# Patient Record
Sex: MALE | Race: WHITE | NOT HISPANIC OR LATINO | Employment: FULL TIME | URBAN - METROPOLITAN AREA
[De-identification: names, ages, dates, MRNs, and addresses within clinical notes are randomized per-mention and may not be internally consistent; named-entity substitution may affect disease eponyms.]

---

## 2018-03-15 ENCOUNTER — OFFICE VISIT (OUTPATIENT)
Dept: FAMILY MEDICINE | Facility: CLINIC | Age: 58
End: 2018-03-15
Payer: COMMERCIAL

## 2018-03-15 VITALS
TEMPERATURE: 98.5 F | OXYGEN SATURATION: 98 % | WEIGHT: 259.9 LBS | SYSTOLIC BLOOD PRESSURE: 118 MMHG | BODY MASS INDEX: 39.39 KG/M2 | DIASTOLIC BLOOD PRESSURE: 74 MMHG | HEIGHT: 68 IN | HEART RATE: 78 BPM

## 2018-03-15 DIAGNOSIS — E11.9 TYPE 2 DIABETES MELLITUS WITHOUT COMPLICATION, WITHOUT LONG-TERM CURRENT USE OF INSULIN (H): ICD-10-CM

## 2018-03-15 DIAGNOSIS — Z12.11 COLON CANCER SCREENING: ICD-10-CM

## 2018-03-15 DIAGNOSIS — Z01.818 PREOP GENERAL PHYSICAL EXAM: Primary | ICD-10-CM

## 2018-03-15 DIAGNOSIS — E66.01 MORBID OBESITY (H): ICD-10-CM

## 2018-03-15 DIAGNOSIS — M48.061 SPINAL STENOSIS OF LUMBAR REGION, UNSPECIFIED WHETHER NEUROGENIC CLAUDICATION PRESENT: ICD-10-CM

## 2018-03-15 LAB
ALBUMIN SERPL-MCNC: 4 G/DL (ref 3.4–5)
ALP SERPL-CCNC: 74 U/L (ref 40–150)
ALT SERPL W P-5'-P-CCNC: 60 U/L (ref 0–70)
ANION GAP SERPL CALCULATED.3IONS-SCNC: 7 MMOL/L (ref 3–14)
AST SERPL W P-5'-P-CCNC: 49 U/L (ref 0–45)
BILIRUB SERPL-MCNC: 0.7 MG/DL (ref 0.2–1.3)
BUN SERPL-MCNC: 13 MG/DL (ref 7–30)
CALCIUM SERPL-MCNC: 8.8 MG/DL (ref 8.5–10.1)
CHLORIDE SERPL-SCNC: 107 MMOL/L (ref 94–109)
CO2 SERPL-SCNC: 27 MMOL/L (ref 20–32)
CREAT SERPL-MCNC: 0.72 MG/DL (ref 0.66–1.25)
ERYTHROCYTE [DISTWIDTH] IN BLOOD BY AUTOMATED COUNT: 13.8 % (ref 10–15)
GFR SERPL CREATININE-BSD FRML MDRD: >90 ML/MIN/1.7M2
GLUCOSE SERPL-MCNC: 112 MG/DL (ref 70–99)
HBA1C MFR BLD: 6.7 % (ref 4.3–6)
HCT VFR BLD AUTO: 46 % (ref 40–53)
HGB BLD-MCNC: 15.4 G/DL (ref 13.3–17.7)
MCH RBC QN AUTO: 29.8 PG (ref 26.5–33)
MCHC RBC AUTO-ENTMCNC: 33.5 G/DL (ref 31.5–36.5)
MCV RBC AUTO: 89 FL (ref 78–100)
PLATELET # BLD AUTO: 210 10E9/L (ref 150–450)
POTASSIUM SERPL-SCNC: 4 MMOL/L (ref 3.4–5.3)
PROT SERPL-MCNC: 6.6 G/DL (ref 6.8–8.8)
RBC # BLD AUTO: 5.16 10E12/L (ref 4.4–5.9)
SODIUM SERPL-SCNC: 141 MMOL/L (ref 133–144)
WBC # BLD AUTO: 3.8 10E9/L (ref 4–11)

## 2018-03-15 PROCEDURE — 83036 HEMOGLOBIN GLYCOSYLATED A1C: CPT | Performed by: FAMILY MEDICINE

## 2018-03-15 PROCEDURE — 36415 COLL VENOUS BLD VENIPUNCTURE: CPT | Performed by: FAMILY MEDICINE

## 2018-03-15 PROCEDURE — 93000 ELECTROCARDIOGRAM COMPLETE: CPT | Performed by: FAMILY MEDICINE

## 2018-03-15 PROCEDURE — 99204 OFFICE O/P NEW MOD 45 MIN: CPT | Performed by: FAMILY MEDICINE

## 2018-03-15 PROCEDURE — 85027 COMPLETE CBC AUTOMATED: CPT | Performed by: FAMILY MEDICINE

## 2018-03-15 PROCEDURE — 80053 COMPREHEN METABOLIC PANEL: CPT | Performed by: FAMILY MEDICINE

## 2018-03-15 NOTE — PROGRESS NOTES
[facility]  [id number] [place] [id number]  [phone]  Dept: [phone]    PRE-OP EVALUATION:  Today's date: 3/15/2018    Jigar Brooks (: 1960) presents for pre-operative evaluation assessment as requested by Dr. Fernández .  He requires evaluation and anesthesia risk assessment prior to undergoing surgery/procedure for treatment of Decompression- Laminectomy L3-L5 .    Proposed Surgery/ Procedure: Decompression- Laminectomy L3-L5 .  Date of Surgery/ Procedure: 3/27/18  Time of Surgery/ Procedure: [facility]  Hospital/Surgical Facility: [facility]   Fax number for surgical facility: [phone]  Primary Physician: Joselito Lepe  Type of Anesthesia Anticipated: General    Patient has a Health Care Directive or Living Will:  NO    1. NO - Do you have a history of heart attack, stroke, stent, bypass or surgery on an artery in the head, neck, heart or legs?  2. NO - Do you ever have any pain or discomfort in your chest?  3. NO - Do you have a history of  Heart Failure?  4. NO - Are you troubled by shortness of breath when: walking on the level, up a slight hill or at night?  5. NO - Do you currently have a cold, bronchitis or other respiratory infection?  6. NO - Do you have a cough, shortness of breath or wheezing?  7. YES - DO YOU SOMETIMES GET PAINS IN THE CALVES OF YOUR LEGS WHEN YOU WALK?   8. NO - Do you or anyone in your family have previous history of blood clots?  9. NO - Do you or does anyone in your family have a serious bleeding problem such as prolonged bleeding following surgeries or cuts?  10. NO - Have you ever had problems with anemia or been told to take iron pills?  11. NO - Have you had any abnormal blood loss such as black, tarry or bloody stools, or abnormal vaginal bleeding?  12. NO - Have you ever had a blood transfusion?  13. NO - Have you or any of your relatives ever had problems with anesthesia?  14. NO - Do you have sleep apnea, excessive snoring  or daytime drowsiness?  15. NO - Do you have any prosthetic heart valves?  16. YES - DO YOU HAVE PROSTHETIC JOINTS? Knee Replacement   17. NO - Is there any chance that you may be pregnant?    HPI:     HPI related to upcoming procedure: Spinal stenosis of lumbar region     Patient with obesity. Has not been as active as normal secondary to back issue.    Patient with diagnosis of diabetes mellitus, type II based on A1c done at preop today of 6.7.  Patient is okay to continue with surgery as planned as we will have initial diet management for diabetes currently controlled.    MEDICAL HISTORY:     Patient Active Problem List    Diagnosis Date Noted     Type 2 diabetes mellitus without complication (H) 03/16/2018     Priority: Medium     Morbid obesity (H) 03/15/2018     Priority: Medium     Pain in joint, lower leg 03/03/2011     Priority: Medium     Knee joint replacement by other means 03/03/2011     Priority: Medium     Aftercare following joint replacement 03/03/2011     Priority: Medium     Obesity 09/02/2010     Priority: Medium     OA (osteoarthritis) of knee 11/23/2009     Priority: Medium      Past Medical History:   Diagnosis Date     Other and unspecified disc disorder of lumbar region     L4-L5     Past Surgical History:   Procedure Laterality Date     C ANESTH,KNEE JOINT,CLOSED PROCEDURE      right and left     C REPAIR CRUCIATE LIGAMENT,KNEE      right     carpal tunnel surgery right       SURGICAL HISTORY OF -       discectomy cervical     No current outpatient prescriptions on file.     OTC products: None, except as noted above    Allergies   Allergen Reactions     Nkda [No Known Drug Allergies]       Latex Allergy: NO    Social History   Substance Use Topics     Smoking status: Former Smoker     Smokeless tobacco: Never Used     Alcohol use Yes      Comment: 8/week     History   Drug Use No       REVIEW OF SYSTEMS:   CONSTITUTIONAL: NEGATIVE for fever, chills, change in weight  INTEGUMENTARY/SKIN:  "NEGATIVE for worrisome rashes, moles or lesions  EYES: NEGATIVE for vision changes or irritation  ENT/MOUTH: NEGATIVE for ear, mouth and throat problems  RESP: NEGATIVE for significant cough or SOB  CV: NEGATIVE for chest pain, palpitations or peripheral edema  GI: NEGATIVE for nausea, abdominal pain, heartburn, or change in bowel habits  : NEGATIVE for frequency, dysuria, or hematuria  MUSCULOSKELETAL: NEGATIVE for significant arthralgias or myalgia  NEURO: NEGATIVE for weakness, dizziness or paresthesias  PSYCHIATRIC: NEGATIVE for changes in mood or affect    This document serves as a record of the services and decisions personally performed and made by Joselito Lepe MD. It was created on his behalf by Ursula Hawkins, a trained medical scribe. The creation of this document is based on the provider's statements to the medical scribe.  Ursula Hawkins 9:19 AM March 15, 2018    EXAM:   /74 (BP Location: Right arm, Patient Position: Chair, Cuff Size: Adult Regular)  Pulse 78  Temp 98.5  F (36.9  C) (Oral)  Ht 5' 8\" (1.727 m)  Wt 259 lb 14.4 oz (117.9 kg)  SpO2 98%  BMI 39.52 kg/m2    GENERAL APPEARANCE: healthy, alert and no distress     EYES: EOMI,  PERRL     HENT: ear canals and TM's normal and nose and mouth without ulcers or lesions     NECK: no adenopathy, no asymmetry, masses, or scars and thyroid normal to palpation     RESP: lungs clear to auscultation - no rales, rhonchi or wheezes     CV: regular rates and rhythm, normal S1 S2, no S3 or S4 and no murmur, click or rub     ABDOMEN:  soft, nontender, no HSM or masses and bowel sounds normal     MS: extremities normal- no gross deformities noted, no evidence of inflammation in joints, FROM in all extremities.     SKIN: no suspicious lesions or rashes     NEURO: Normal strength and tone, sensory exam grossly normal, mentation intact and speech normal     PSYCH: mentation appears normal. and affect normal/bright    DIAGNOSTICS:   EKG: appears normal, " NSR, normal axis, normal intervals, no acute ST/T changes c/w ischemia, no LVH by voltage criteria, unchanged from previous tracings    Recent Labs   Lab Test  02/14/11   1552  09/22/10   1528   06/17/10   0933   HGB  15.1  14.7   < >  14.8   PLT  224  296   < >  265   NA   --    --    --   142   POTASSIUM   --    --    --   4.3   CR   --    --    --   0.83    < > = values in this interval not displayed.     IMPRESSION:   Reason for surgery/procedure: Spinal stenosis of lumbar region   Diagnosis/reason for consult: preoperative evaluation for assessment of cardiovascular and respiratory disease as well as overall risk assessment and perioperative medical management.      The proposed surgical procedure is considered INTERMEDIATE risk.    REVISED CARDIAC RISK INDEX  The patient has the following serious cardiovascular risks for perioperative complications such as (MI, PE, VFib and 3  AV Block):  No serious cardiac risks  INTERPRETATION: 0 risks: Class I (very low risk - 0.4% complication rate)    The patient has the following additional risks for perioperative complications:  No identified additional risks      ICD-10-CM    1. Preop general physical exam Z01.818 EKG 12-lead complete w/read - Clinics     Hemoglobin A1c     CBC with platelets     Comprehensive metabolic panel   2. Spinal stenosis of lumbar region, unspecified whether neurogenic claudication present M48.061    3. Morbid obesity (H) E66.01    4. Colon cancer screening Z12.11 Fecal colorectal cancer screen FIT   5. Type 2 diabetes mellitus without complication, without long-term current use of insulin (H) E11.9      RECOMMENDATIONS:     --Patient is to take all scheduled medications on the day of surgery EXCEPT for modifications listed below.    Anticoagulant or Antiplatelet Medication Use  ASPIRIN: Discontinue ASA 7-10 days prior to procedure to reduce bleeding risk.  It should be resumed post-operatively.  NSAIDS: Ibuprofen (Motrin):         Stop five  days prior to surgery  Naproxen (Naprosyn):   Stop five days prior to surgery        APPROVAL GIVEN to proceed with proposed procedure, without further diagnostic evaluation     The information in this document, created by the medical scribe for me, accurately reflects the services I personally performed and the decisions made by me. I have reviewed and approved this document for accuracy prior to leaving the patient care area.  March 15, 2018 9:30 AM    Signed Electronically by: Joselito Lepe MD    Copy of this evaluation report is provided to requesting physician.    [facility] Preop Guidelines

## 2018-03-15 NOTE — MR AVS SNAPSHOT
After Visit Summary   3/15/2018    Jigar Brooks    MRN: [id number]           Patient Information     Date Of Birth          1960        Visit Information        Provider Department      3/15/2018 9:00 AM Joselito Lepe MD [facility]        Today's Diagnoses     Preop general physical exam    -  1    Spinal stenosis of lumbar region, unspecified whether neurogenic claudication present        Morbid obesity (H)          Care Instructions      Before Your Surgery      Call your surgeon if there is any change in your health. This includes signs of a cold or flu (such as a sore throat, runny nose, cough, rash or fever).    Do not smoke, drink alcohol or take over the counter medicine (unless your surgeon or primary care doctor tells you to) for the 24 hours before and after surgery.    If you take prescribed drugs: Follow your doctor s orders about which medicines to take and which to stop until after surgery.    Eating and drinking prior to surgery: follow the instructions from your surgeon    Take a shower or bath the night before surgery. Use the soap your surgeon gave you to gently clean your skin. If you do not have soap from your surgeon, use your regular soap. Do not shave or scrub the surgery site.  Wear clean pajamas and have clean sheets on your bed.           Follow-ups after your visit        Who to contact     If you have questions or need follow up information about today's clinic visit or your schedule please contact [facility] directly at [phone].  Normal or non-critical lab and imaging results will be communicated to you by MyChart, letter or phone within 4 business days after the clinic has received the results. If you do not hear from us within 7 days, please contact the clinic through MyChart or phone. If you have a critical or abnormal lab result, we will notify you by phone as soon as possible.  Submit refill requests through [employer] or call  "your pharmacy and they will forward the refill request to us. Please allow 3 business days for your refill to be completed.          Additional Information About Your Visit        MyChart Information     [employer] gives you secure access to your electronic health record. If you see a primary care provider, you can also send messages to your care team and make appointments. If you have questions, please call your primary care clinic.  If you do not have a primary care provider, please call [phone] and they will assist you.        Care EveryWhere ID     This is your Care EveryWhere ID. This could be used by other organizations to access your [facility] records  [id number]        Your Vitals Were     Pulse Temperature Height Pulse Oximetry BMI (Body Mass Index)       78 98.5  F (36.9  C) (Oral) 5' 8\" (1.727 m) 98% 39.52 kg/m2        Blood Pressure from Last 3 Encounters:   03/15/18 118/74   08/15/13 110/66   02/14/11 118/76    Weight from Last 3 Encounters:   03/15/18 259 lb 14.4 oz (117.9 kg)   08/15/13 246 lb (111.6 kg)   02/14/11 269 lb (122 kg)              We Performed the Following     CBC with platelets     Comprehensive metabolic panel     EKG 12-lead complete w/read - Clinics     Hemoglobin A1c          Today's Medication Changes          These changes are accurate as of 3/15/18  9:30 AM.  If you have any questions, ask your nurse or doctor.               Stop taking these medicines if you haven't already. Please contact your care team if you have questions.     amoxicillin 500 MG capsule   Commonly known as:  AMOXIL   Stopped by:  Joselito Lepe MD           NO ACTIVE MEDICATIONS   Stopped by:  Joselito Lepe MD                    Primary Care Provider Office Phone # Fax #    Joselito Lepe [phone] [address]  [facility] [address]        Equal Access to Services     MADDY LOBATO AH: Samson Salinas, katie angulo, qavladislavta lorelei escobedo, " marilin ruiarteirwin orta'aan ah. So [facility] [phone].    ATENCIÓN: Si habla español, tiene a chacon disposición servicios gratuitos de asistencia lingüística. Abi al [phone].    We comply with applicable federal civil rights laws and [place] laws. We do not discriminate on the basis of race, color, national origin, age, disability, sex, sexual orientation, or gender identity.            Thank you!     Thank you for choosing [facility]  for your care. Our goal is always to provide you with excellent care. Hearing back from our patients is one way we can continue to improve our services. Please take a few minutes to complete the written survey that you may receive in the mail after your visit with us. Thank you!             Your Updated Medication List - Protect others around you: Learn how to safely use, store and throw away your medicines at www.disposemymeds.org.      Notice  As of 3/15/2018  9:30 AM    You have not been prescribed any medications.

## 2018-03-16 PROBLEM — E11.9 TYPE 2 DIABETES MELLITUS WITHOUT COMPLICATION (H): Status: ACTIVE | Noted: 2018-03-16

## 2018-03-20 ENCOUNTER — TELEPHONE (OUTPATIENT)
Dept: FAMILY MEDICINE | Facility: CLINIC | Age: 58
End: 2018-03-20

## 2018-03-20 NOTE — TELEPHONE ENCOUNTER
Preop faxed to [phone], please call patient and inform him of Dr. Lepe message below.  Tori Chow

## 2018-03-20 NOTE — TELEPHONE ENCOUNTER
Patient with elevated A1c consistent with diabetes type 2.  Recommend follow-up in clinic for further discussion of diagnosis and ongoing management.  Ok to go ahead with surgery as planned as this would be considered controlled DM and no further blood sugar management would be needed perioperatively.

## 2018-05-10 ENCOUNTER — TRANSFERRED RECORDS (OUTPATIENT)
Dept: HEALTH INFORMATION MANAGEMENT | Facility: CLINIC | Age: 58
End: 2018-05-10

## 2018-05-22 ENCOUNTER — TRANSFERRED RECORDS (OUTPATIENT)
Dept: HEALTH INFORMATION MANAGEMENT | Facility: CLINIC | Age: 58
End: 2018-05-22

## 2018-10-10 ENCOUNTER — TELEPHONE (OUTPATIENT)
Dept: FAMILY MEDICINE | Facility: CLINIC | Age: 58
End: 2018-10-10

## 2018-10-10 NOTE — TELEPHONE ENCOUNTER
Panel Management Review      Patient has the following on his problem list:     Diabetes    ASA: Failed    Last A1C  Lab Results   Component Value Date    A1C 6.7 03/15/2018     A1C tested: Passed    Last LDL:    Lab Results   Component Value Date    CHOL 214 11/23/2009     Lab Results   Component Value Date    HDL 44 11/23/2009     Lab Results   Component Value Date     11/23/2009     Lab Results   Component Value Date    TRIG 114 11/23/2009     Lab Results   Component Value Date    CHOLHDLRATIO 4.9 11/23/2009     No results found for: NHDL    Is the patient on a Statin? NO             Is the patient on Aspirin? NO        Last three blood pressure readings:  BP Readings from Last 3 Encounters:   03/15/18 118/74   08/15/13 110/66   02/14/11 118/76       Date of last diabetes office visit: 3/15/18     Tobacco History:     History   Smoking Status     Former Smoker   Smokeless Tobacco     Never Used           Composite cancer screening  Chart review shows that this patient is due/due soon for the following 3 month diabetes check, colonoscopy   Summary:    Patient is due/failing the following:   A1C- 3 month diabetes check , colonoscopy     Action needed:   Patient needs office visit for diabetes check .    Type of outreach:    Phone, left message for patient to call back.     Questions for provider review:    None                                                                                                                                    Ila Del Real CMA       Chart routed to none .

## 2019-02-08 ENCOUNTER — TELEPHONE (OUTPATIENT)
Dept: ORTHOPEDICS | Facility: CLINIC | Age: 59
End: 2019-02-08

## 2019-02-08 ENCOUNTER — ANCILLARY PROCEDURE (OUTPATIENT)
Dept: GENERAL RADIOLOGY | Facility: CLINIC | Age: 59
End: 2019-02-08
Attending: PHYSICIAN ASSISTANT
Payer: COMMERCIAL

## 2019-02-08 ENCOUNTER — PRE VISIT (OUTPATIENT)
Dept: ORTHOPEDICS | Facility: CLINIC | Age: 59
End: 2019-02-08

## 2019-02-08 ENCOUNTER — OFFICE VISIT (OUTPATIENT)
Dept: ORTHOPEDICS | Facility: CLINIC | Age: 59
End: 2019-02-08
Payer: COMMERCIAL

## 2019-02-08 ENCOUNTER — ANCILLARY PROCEDURE (OUTPATIENT)
Dept: CT IMAGING | Facility: CLINIC | Age: 59
End: 2019-02-08
Attending: ORTHOPAEDIC SURGERY
Payer: COMMERCIAL

## 2019-02-08 ENCOUNTER — DOCUMENTATION ONLY (OUTPATIENT)
Dept: CARE COORDINATION | Facility: CLINIC | Age: 59
End: 2019-02-08

## 2019-02-08 VITALS — BODY MASS INDEX: 39.19 KG/M2 | HEIGHT: 69 IN | WEIGHT: 264.6 LBS

## 2019-02-08 DIAGNOSIS — M89.9 BONE LESION: ICD-10-CM

## 2019-02-08 DIAGNOSIS — M79.671 RIGHT FOOT PAIN: ICD-10-CM

## 2019-02-08 DIAGNOSIS — M89.9 BONE LESION: Primary | ICD-10-CM

## 2019-02-08 RX ORDER — METHYLPREDNISOLONE 4 MG
TABLET, DOSE PACK ORAL
Qty: 21 TABLET | Refills: 0 | Status: SHIPPED | OUTPATIENT
Start: 2019-02-08 | End: 2022-02-11

## 2019-02-08 ASSESSMENT — MIFFLIN-ST. JEOR: SCORE: 1996.63

## 2019-02-08 NOTE — PROGRESS NOTES
Dear Dr. eLpe,    Thank you for asking me to see  for evaluation of what is likely an incidental finding in the right iliac wing.  As you know he has had significant difficulty with low back problems both in the past and currently.  His evaluation identified proximal at 1.5 cm abnormality seen on his MRI disc lateral to the sacroiliac joint.    We try to confirm the presence of a lesion on x-ray but were not able to provide convincing evidence.  I recommended a CT to be sure the lesion is osteolytic.  If it is not osteolytic then it may be something that could simply be followed with MRI scan.  Also recommended a screening studies for myeloma.    We will keep you and the patient informed on her findings and she will forward.  We did prescribe a Medrol Dosepak today she is having significant buttock and radicular pain.    Thank you for involving his care.    Sincerely

## 2019-02-08 NOTE — NURSING NOTE
"Chief Complaint   Patient presents with     Consult     Pt. states that he is here today for a Lesion of his Pelvis, Right Side. He states that a year ago he had a Laminectomy 1yr ago along with a Disectomy that he had a Bone graft done in of the Iliac Bone, around 8yrs ago.  He states that a couple weeks ago his lower back became bothersome after he slipped a week prior onto his back. He states that he went in for the pain and an MRI was ordered detecting the Lesion. Referring: Dr. Coleman       59 year old  1960    Ht 1.746 m (5' 8.75\")   Wt 120 kg (264 lb 9.6 oz)   BMI 39.36 kg/m           [employer] [facility] - [place], [place] - [address] [facility] AT [address]      Allergies   Allergen Reactions     Nkda [No Known Drug Allergies]        No current outpatient medications on file.     No current facility-administered medications for this visit.                    "

## 2019-02-08 NOTE — LETTER
RE: Jigar Brooks  [address] [id number]     Dear Colleague,    Thank you for referring your patient, Jigar Brooks, to the [facility]. Please see a copy of my visit note below.    Chief Complaint: R iliac bone lesion with right-sided low back pain; also c/o right lateral foot pain after fall 3 wks ago    History: Jigar is a 59-year-old male who is here with his wife today for further evaluation and treatment of a right posterior pelvis bone lesion noted on MRI.  Patient reports that he fell 3 weeks ago.  Approximately a week later, he started having pain in the right posterior pelvis and low back area.  He did not have any radiating leg pain.  He was concerned, however, because he does have a history of a lumbar laminectomy and discectomy.  He also has a history of a anterior cervical spine fusion with grafting from the right side of the pelvis.  He denies any bruising or swelling.  He denies any mass.  Prior to a few weeks ago, he was not having any pain in this area.  He has taken Vicodin sporadically with little relief.  He tried ibuprofen with no relief.  Heat does seem to help somewhat.  He is not using anything for gait assistance.  He had an MRI to see what was the issue, and they noted a bone lesion in the right posterior iliac bone near the SI joint, which was new from the previous MRI 2 years ago.  He had an MRI with and without contrast.  He also had a bone scan.  He denies any other areas of pain, except right foot pain laterally, which he injured in the fall as well.  He thinks he inverted his foot and ankle.  No other concerns.  He is semiretired, but used to work as a  and did prolonged standing.  Now he does mostly desk work.  He used to smoke many decades ago for a few years, but nothing recently.  He is otherwise fairly healthy.  I reviewed his medical intake in the electronic medical record.    Past  "Medical History:   Diagnosis Date     Other and unspecified disc disorder of lumbar region     L4-L5       Past Surgical History:   Procedure Laterality Date     C ANESTH,KNEE JOINT,CLOSED PROCEDURE      right and left     C REPAIR CRUCIATE LIGAMENT,KNEE      right     carpal tunnel surgery right       SURGICAL HISTORY OF -       discectomy cervical       Family History   Problem Relation Age of Onset     Diabetes Mother      Diabetes Father      Heart Disease Father         afib at age 81     C.A.D. Paternal Grandfather          age 58     Breast Cancer No family hx of      Cancer - colorectal No family hx of      Prostate Cancer No family hx of        Social History     Tobacco Use     Smoking status: Former Smoker     Smokeless tobacco: Never Used   Substance Use Topics     Alcohol use: Yes     Comment: 8/week       Meds:   No current outpatient medications on file.     No current facility-administered medications for this visit.        Allergies:    Allergies   Allergen Reactions     Nkda [No Known Drug Allergies]      Physical Exam: Ht 1.746 m (5' 8.75\")   Wt 120 kg (264 lb 9.6 oz)   BMI 39.36 kg/m     Jigar is a pleasant 59-year-old male who is alert and oriented and in no distress.  He is pacing about the room, as it is difficult for him to sit.  He complains of pain with palpation around the right SI joint area.  He does have a midline incision in the lumbar spine consistent with his previous back surgery.  There is no obvious swelling, erythema or ecchymosis.  He is previous iliac crest scar is in the anterior inguinal fold.  He has pain with forward flexion and is quite limited due to this pain.  He is able to hyperextend the lumbar spine somewhat, but is again limited by pain posteriorly.  He has approximately 75% of normal rotation and side bending, again with pain.  No radiating leg pain.  He has full strength and range of motion of the hips and knees bilaterally.  He does have some pain and " tenderness along the lateral border of the right foot with no obvious crepitus.  He has some pain along the peroneals of the right ankle.  He is otherwise neurovascularly intact.    Imaging: MRI with and without contrast of the pelvis done at outside facility shows a approximately 2 cm enhancing mass in the right posterior ilium, with no associated bone marrow edema or fracture.  This could represent a benign lesion or perhaps metastatic lesion or multiple myeloma.  Whole body bone scan does not show any specific uptake in the area of this lesion.  AP pelvis and inlet and outlet views of the pelvis x-rays were ordered today show a subtle lucency in the area of the right ilium corresponding to the MRI.  No associated fracture.    Impression: 59-year-old male with right sided low back/SI joint pain with incidental finding of right ilium bone lesion    Plan: We think that this lesion is an incidental finding, and not necessarily the source of the pain.  However, we do need to further work this up.  Would like to get lab work to rule out multiple myeloma, including Kappa/lamda light chain, SPEP, urine protein electrophoresis.  Because this lesion is visible on x-ray, we would like to get a CT of the pelvis to further evaluate the lesion and look for bony destruction.  Based on the results of the labs and CT, we will call him and discuss further intervention, which may include biopsy by interventional radiology or repeat imaging in a few months.  In the meantime, we did prescribe him a Medrol Dosepak to help with his pain, as this has been helpful for him in the past.  He can continue with ice and heat as tolerated.  He can try a lumbar support to see if that is helpful.  He should follow-up with his spine surgeon to further explore this pain source and treatment of his pain.  We will continue with treatment of this bone lesion.  He understands and agrees with the plan of care and all questions have been  answered.  Patient was also examined by Dr. Fields, and he agrees with the plan of care.        Dear Dr. Lepe,    Thank you for asking me to see  for evaluation of what is likely an incidental finding in the right iliac wing.  As you know he has had significant difficulty with low back problems both in the past and currently.  His evaluation identified proximal at 1.5 cm abnormality seen on his MRI disc lateral to the sacroiliac joint.    We try to confirm the presence of a lesion on x-ray but were not able to provide convincing evidence.  I recommended a CT to be sure the lesion is osteolytic.  If it is not osteolytic then it may be something that could simply be followed with MRI scan.  Also recommended a screening studies for myeloma.    We will keep you and the patient informed on her findings and she will forward.  We did prescribe a Medrol Dosepak today she is having significant buttock and radicular pain.    Thank you for involving his care.    Sincerely    Again, thank you for allowing me to participate in the care of your patient.      Sincerely,    Yong Fields MD

## 2019-02-08 NOTE — TELEPHONE ENCOUNTER
RECORDS RECEIVED FROM: pt referred by [facility] for Lesion/Tumor in pelvis, bone marrow 2.2 cm Dr Beach- MRI done at [facility] body scan done at the [facility] (pt has imaging discs with him that he will hand carry to the appt)   DATE RECEIVED: 02/08/19    NOTES STATUS DETAILS   OFFICE NOTE from referring provider Care Everywhere 2/4/19   OFFICE NOTE from other specialist N/A    DISCHARGE SUMMARY from hospital N/A    DISCHARGE REPORT from the ER N/A    OPERATIVE REPORT N/A    MEDICATION LIST Internal    IMPLANT RECORD/STICKER N/A    LABS     CBC/DIFF N/A    CULTURES N/A    INJECTIONS DONE IN RADIOLOGY N/A    MRI Received 2/5/19   CT SCAN N/A    XRAYS (IMAGES & REPORTS) N/A    TUMOR     PATHOLOGY  Slides & report N/A      02/08/19  10:31 AM left vm at [facility] requesting MRI  11:02 AM spoke with [facility] imaging, they will push

## 2019-02-08 NOTE — PROGRESS NOTES
Chief Complaint: R iliac bone lesion with right-sided low back pain; also c/o right lateral foot pain after fall 3 wks ago    History: Jigar is a 59-year-old male who is here with his wife today for further evaluation and treatment of a right posterior pelvis bone lesion noted on MRI.  Patient reports that he fell 3 weeks ago.  Approximately a week later, he started having pain in the right posterior pelvis and low back area.  He did not have any radiating leg pain.  He was concerned, however, because he does have a history of a lumbar laminectomy and discectomy.  He also has a history of a anterior cervical spine fusion with grafting from the right side of the pelvis.  He denies any bruising or swelling.  He denies any mass.  Prior to a few weeks ago, he was not having any pain in this area.  He has taken Vicodin sporadically with little relief.  He tried ibuprofen with no relief.  Heat does seem to help somewhat.  He is not using anything for gait assistance.  He had an MRI to see what was the issue, and they noted a bone lesion in the right posterior iliac bone near the SI joint, which was new from the previous MRI 2 years ago.  He had an MRI with and without contrast.  He also had a bone scan.  He denies any other areas of pain, except right foot pain laterally, which he injured in the fall as well.  He thinks he inverted his foot and ankle.  No other concerns.  He is semiretired, but used to work as a  and did prolonged standing.  Now he does mostly desk work.  He used to smoke many decades ago for a few years, but nothing recently.  He is otherwise fairly healthy.  I reviewed his medical intake in the electronic medical record.    Past Medical History:   Diagnosis Date     Other and unspecified disc disorder of lumbar region     L4-L5       Past Surgical History:   Procedure Laterality Date     C ANESTH,KNEE JOINT,CLOSED PROCEDURE      right and left     C REPAIR CRUCIATE LIGAMENT,KNEE      right      "carpal tunnel surgery right       SURGICAL HISTORY OF -       discectomy cervical       Family History   Problem Relation Age of Onset     Diabetes Mother      Diabetes Father      Heart Disease Father         afib at age 81     C.A.D. Paternal Grandfather          age 58     Breast Cancer No family hx of      Cancer - colorectal No family hx of      Prostate Cancer No family hx of        Social History     Tobacco Use     Smoking status: Former Smoker     Smokeless tobacco: Never Used   Substance Use Topics     Alcohol use: Yes     Comment: 8/week       Meds:   No current outpatient medications on file.     No current facility-administered medications for this visit.        Allergies:    Allergies   Allergen Reactions     Nkda [No Known Drug Allergies]        Review of Systems:  10 pt ROS was negative, except as listed in the HPI.    Physical Exam: Ht 1.746 m (5' 8.75\")   Wt 120 kg (264 lb 9.6 oz)   BMI 39.36 kg/m    Jigar is a pleasant 59-year-old male who is alert and oriented and in no distress.  He is pacing about the room, as it is difficult for him to sit.  He complains of pain with palpation around the right SI joint area.  He does have a midline incision in the lumbar spine consistent with his previous back surgery.  There is no obvious swelling, erythema or ecchymosis.  He is previous iliac crest scar is in the anterior inguinal fold.  He has pain with forward flexion and is quite limited due to this pain.  He is able to hyperextend the lumbar spine somewhat, but is again limited by pain posteriorly.  He has approximately 75% of normal rotation and side bending, again with pain.  No radiating leg pain.  He has full strength and range of motion of the hips and knees bilaterally.  He does have some pain and tenderness along the lateral border of the right foot with no obvious crepitus.  He has some pain along the peroneals of the right ankle.  He is otherwise neurovascularly intact.    Imaging: MRI " with and without contrast of the pelvis done at outside facility shows a approximately 2 cm enhancing mass in the right posterior ilium, with no associated bone marrow edema or fracture.  This could represent a benign lesion or perhaps metastatic lesion or multiple myeloma.  Whole body bone scan does not show any specific uptake in the area of this lesion.  AP pelvis and inlet and outlet views of the pelvis x-rays were ordered today show a subtle lucency in the area of the right ilium corresponding to the MRI.  No associated fracture.    Impression: 59-year-old male with right sided low back/SI joint pain with incidental finding of right ilium bone lesion    Plan: We think that this lesion is an incidental finding, and not necessarily the source of the pain.  However, we do need to further work this up.  Would like to get lab work to rule out multiple myeloma, including Kappa/lamda light chain, SPEP, urine protein electrophoresis.  Because this lesion is visible on x-ray, we would like to get a CT of the pelvis to further evaluate the lesion and look for bony destruction.  Based on the results of the labs and CT, we will call him and discuss further intervention, which may include biopsy by interventional radiology or repeat imaging in a few months.  In the meantime, we did prescribe him a Medrol Dosepak to help with his pain, as this has been helpful for him in the past.  He can continue with ice and heat as tolerated.  He can try a lumbar support to see if that is helpful.  He should follow-up with his spine surgeon to further explore this pain source and treatment of his pain.  We will continue with treatment of this bone lesion.  He understands and agrees with the plan of care and all questions have been answered.  Patient was also examined by Dr. Fields, and he agrees with the plan of care.

## 2019-02-11 LAB
ALBUMIN SERPL ELPH-MCNC: 4.4 G/DL (ref 3.7–5.1)
ALPHA1 GLOB SERPL ELPH-MCNC: 0.4 G/DL (ref 0.2–0.4)
ALPHA2 GLOB SERPL ELPH-MCNC: 0.6 G/DL (ref 0.5–0.9)
B-GLOBULIN SERPL ELPH-MCNC: 0.7 G/DL (ref 0.6–1)
GAMMA GLOB SERPL ELPH-MCNC: 0.5 G/DL (ref 0.7–1.6)
KAPPA LC UR-MCNC: 0.86 MG/DL (ref 0.33–1.94)
KAPPA LC/LAMBDA SER: 0.69 {RATIO} (ref 0.26–1.65)
LAMBDA LC SERPL-MCNC: 1.24 MG/DL (ref 0.57–2.63)
M PROTEIN SERPL ELPH-MCNC: 0 G/DL
PROT PATTERN SERPL ELPH-IMP: ABNORMAL
PROT PATTERN UR ELPH-IMP: NORMAL

## 2019-02-14 ENCOUNTER — TELEPHONE (OUTPATIENT)
Dept: ORTHOPEDICS | Facility: CLINIC | Age: 59
End: 2019-02-14

## 2019-02-14 NOTE — TELEPHONE ENCOUNTER
[facility] Call Center    Phone Message    May a detailed message be left on voicemail: yes    Reason for Call: Requesting Results   Name/type of test: Imaging and labs  Date of test: 02/08/2019  Was test done at a location other than [facility] (Please fill in the location if not [facility])?: No      Action Taken: Message routed to:  [facility] ([facility] Ortho

## 2019-02-15 NOTE — TELEPHONE ENCOUNTER
Spoke with Jigar re: his recent test results.  Labs were negative for any metastatic disease.  The CT of his pelvis showed no bone destruction.  He had been prescribed a medrol dosepak by .  Jigar reports that his back pain has resolved.  Dr. Fields is recommending a repeat MRI of the pelvis in 3 to 4 months to evaluate for any change in the right pelvic lesion.  Jigar would like to have the MRI done by Dr. Beach at [facility] and then sent to Dr. Fields for review.  We erick send all of Jigar's notes and test results to Dr. Beach.

## 2019-09-30 ENCOUNTER — HEALTH MAINTENANCE LETTER (OUTPATIENT)
Age: 59
End: 2019-09-30

## 2021-01-15 ENCOUNTER — HEALTH MAINTENANCE LETTER (OUTPATIENT)
Age: 61
End: 2021-01-15

## 2021-08-29 ENCOUNTER — HEALTH MAINTENANCE LETTER (OUTPATIENT)
Age: 61
End: 2021-08-29

## 2021-10-04 ENCOUNTER — OFFICE VISIT (OUTPATIENT)
Dept: URGENT CARE | Facility: URGENT CARE | Age: 61
End: 2021-10-04
Payer: COMMERCIAL

## 2021-10-04 VITALS
HEART RATE: 81 BPM | BODY MASS INDEX: 29.8 KG/M2 | SYSTOLIC BLOOD PRESSURE: 120 MMHG | DIASTOLIC BLOOD PRESSURE: 60 MMHG | TEMPERATURE: 98.5 F | OXYGEN SATURATION: 97 % | WEIGHT: 200.31 LBS

## 2021-10-04 DIAGNOSIS — S61.216A LACERATION OF RIGHT LITTLE FINGER WITHOUT FOREIGN BODY WITHOUT DAMAGE TO NAIL, INITIAL ENCOUNTER: Primary | ICD-10-CM

## 2021-10-04 PROCEDURE — 90471 IMMUNIZATION ADMIN: CPT | Performed by: PHYSICIAN ASSISTANT

## 2021-10-04 PROCEDURE — 12001 RPR S/N/AX/GEN/TRNK 2.5CM/<: CPT | Performed by: PHYSICIAN ASSISTANT

## 2021-10-04 PROCEDURE — 90715 TDAP VACCINE 7 YRS/> IM: CPT | Performed by: PHYSICIAN ASSISTANT

## 2021-10-04 NOTE — PATIENT INSTRUCTIONS
Patient was educated on the natural course of injury.  Keep wound dry and clean. Wash area with soap and water. Watch for signs of infection. Conservative measures discussed including over-the-counter Tylenol as needed for pain. See your primary care provider in 10 days for suture removal or sooner as needed. Seek emergency care if you develop fever, streaking, severe pain or redness.

## 2021-10-04 NOTE — PROGRESS NOTES
URGENT CARE VISIT:    SUBJECTIVE:   Jigar Brooks is a 61 year old male who presents to the clinic with a laceration on the right pinky fingersustained 1 hour(s) ago.  This is a non-work related injury.  Mechanism of injury: knife.    Associated symptoms: Denies numbness, weakness, or loss of function  Last tetanus booster within 10 years: no    OBJECTIVE:  VITALS: /60 (BP Location: Right arm)   Pulse 81   Temp 98.5  F (36.9  C) (Tympanic)   Wt 90.9 kg (200 lb 5 oz)   SpO2 97%   BMI 29.80 kg/m    General: WDWN in NAD  Size of laceration: 1.5 centimeters  Location of laceration: right 5th pip joint  Characteristics of the laceration: bleeding- mild and straight  Tendon function intact: yes  Sensation to light touch intact: yes  Pulses intact: yes    ASSESSMENT:    ICD-10-CM    1. Laceration of right little finger without foreign body without damage to nail, initial encounter  S61.216A REPAIR SUPERFICIAL, WOUND BODY < =2.5CM       PLAN:  PROCEDURE NOTE:  Prepped and draped in the usual sterile fashion  Wound cleaned with HIBICLENS  Wound was locally injected with 3 cc's of Lidocaine 1% plain  Laceration was closed using 3 4-0 nylon interrupted sutures  Wound was dressed with gauze.     Patient Instructions   Patient was educated on the natural course of injury.  Keep wound dry and clean. Wash area with soap and water. Watch for signs of infection. Conservative measures discussed including over-the-counter Tylenol as needed for pain. See your primary care provider in 10 days for suture removal or sooner as needed. Seek emergency care if you develop fever, streaking, severe pain or redness.       Patient verbalized understanding and is agreeable to plan. The patient was discharged ambulatory and in stable condition.    Tish Burgos PA-C ....................  10/4/2021   5:59 PM

## 2021-10-24 ENCOUNTER — HEALTH MAINTENANCE LETTER (OUTPATIENT)
Age: 61
End: 2021-10-24

## 2022-01-17 ENCOUNTER — TELEPHONE (OUTPATIENT)
Dept: ORTHOPEDICS | Facility: CLINIC | Age: 62
End: 2022-01-17
Payer: COMMERCIAL

## 2022-01-17 NOTE — TELEPHONE ENCOUNTER
ATC called pt and informed him that we can see notes from the visits that he had with [facility]. We don't see the actual imaging yet. ATC informed pt that we have a record team and they will work on this. Pt verified understanding.     Pt also stated that he's supposed to see Dr. Fields on 1/27 but due to being out of town, he rescheduled the visit to 2/10. Pt will give us a call with questions or concerns.           -Jonel, ATC- Orthopedics

## 2022-01-17 NOTE — TELEPHONE ENCOUNTER
[facility] Call Center    Phone Message    May a detailed message be left on voicemail: yes     Reason for Call: Other: Patient wanted to know if records from [facility] have arrived. Please contact patient      Action Taken: Message routed to:  [facility] ortho    Travel Screening: Not Applicable

## 2022-01-18 NOTE — TELEPHONE ENCOUNTER
RECORDS RECEIVED FROM: FU  Lesion/Tumor in pelvis, bone marrow 2.2 cm Dr Beach- MRI done at [facility]  (pt has imaging discs with him that he will hand carry to the appt)   DATE RECEIVED: Feb 10, 2022     NOTES STATUS DETAILS   OFFICE NOTE from referring provider Care Everywhere Cuca Toribio, APRN, CNP     MEDICATION LIST Internal    INJECTIONS DONE IN RADIOLOGY In process-R 1/21/22  MORE IN PACS   MRI In process-R 1/7/22  MORE IN PACS   CT SCAN Internal 2/8/19   XRAYS (IMAGES & REPORTS) Internal 2/8/19 01/28/22   7:52 AM   IMAGES RESOLVED IN PACS  Salome Han CMA      01/27/22   8:43 AM   FAXED REQUEST TO  FOR IMAGES  Salome Han CMA

## 2022-02-10 ENCOUNTER — PRE VISIT (OUTPATIENT)
Dept: ORTHOPEDICS | Facility: CLINIC | Age: 62
End: 2022-02-10

## 2022-02-10 ENCOUNTER — OFFICE VISIT (OUTPATIENT)
Dept: ORTHOPEDICS | Facility: CLINIC | Age: 62
End: 2022-02-10
Payer: COMMERCIAL

## 2022-02-10 DIAGNOSIS — M89.9 BONE LESION: Primary | ICD-10-CM

## 2022-02-10 PROCEDURE — 99213 OFFICE O/P EST LOW 20 MIN: CPT | Performed by: ORTHOPAEDIC SURGERY

## 2022-02-10 NOTE — NURSING NOTE
Chief Complaint   Patient presents with     Consult     right iliac lesion last seen 2019 // referred back by Dr. Beach at [facility] // review recent MRI // been having lower back pain and had bilateral injections about 2 wks ago        62 year old  1960          Pain Assessment  Patient Currently in Pain: Yes  0-10 Pain Scale: 9  Primary Pain Location: Back (lower back shooting to right foot)  Pain Descriptors: Radiating  Alleviating Factors: Rest  Aggravating Factors: Stairs             [id number] - [place], [place] - [address] AT [facility] [address]  [id number] - [place], [place] - [address] AT [facility] [address]        Allergies   Allergen Reactions     Nkda [No Known Drug Allergies]            Current Outpatient Medications   Medication     methylPREDNISolone (MEDROL DOSEPAK) 4 MG tablet therapy pack     No current facility-administered medications for this visit.

## 2022-02-10 NOTE — LETTER
2/10/2022         RE: Jigar Brooks  [address] [facility] [address]        Dear Colleague,    Thank you for referring your patient, Jigar Brooks, to the [facility]. Please see a copy of my visit note below.    Chief Complaint: follow-up right iliac wing bone lesion    HPI: Jigar is a 62 year old male who is here for follow-up of a bone lesion incidentally found on MRI in 2019 when he was having some back pain.  Because of the pandemic, he did not see any providers for quite a while.  He started experiencing recurrent back pain a few months ago.  Since his primary care physician retired, he needed to reestablish care for his back.  A repeat MRI was ordered.  They also wanted to follow-up on the right iliac wing bone lesion and have asked to reassess that.  He recently had bilateral S1 neural foramen injections a couple weeks ago with little relief for his back pain and radiating leg pain.  He has known lumbar arthritis and has a history of previous L3-L4 complete laminectomy and L2 partial laminectomy.  He reports he is otherwise doing well.  He has no pain with sitting, just pain with prolonged walking and going up and down stairs.  No other concerns    Physical Exam: Jigar is a pleasant 62-year-old male who is alert and oriented no apparent distress.  He has a nonantalgic gait without gait assistance today.    Imaging: On 1/7/2022, he had an MRI of the pelvis with and without contrast which showed:  IMPRESSION:   1. Resolution of the previously seen lesion in the right iliac bone since 02/05/2019.   2. Moderate degenerative changes in the sacroiliac joints.   3. Mild degenerative changes in the bilateral hips with no synovitis.   4. Mild strain in the paraspinal muscles on left.   5. Mild gluteus minimus tendinosis bilaterally.    On 1/5/2022, he had a lumbar spine MRI without contrast which showed:  1. Multilevel degenerative changes throughout the lumbar  spine    Impression: 62-year-old male with previous right iliac bone lesion, now completely resolved    Plan: We explained to Jigar that the lesion is no longer visible on MRI.  We reviewed the scans very thoroughly.  We cannot explain what the lesion was or why it has resolved, but we have no concerns about this lesion any longer.  He does not need any further follow-up for the lesion.  He should continue to see his providers in regards to treatment for his low back pain and spinal stenosis.  He understands and agrees with the plan.  All questions have been answered.  Patient was also examined by Dr. Fields, and he agrees with the plan of care.        Attestation signed by Yong Fields MD at 2/11/2022  4:05 PM:  I saw the patient with Linda GARCIA.  I agree with her assessment and plan.

## 2022-02-10 NOTE — PROGRESS NOTES
Chief Complaint: follow-up right iliac wing bone lesion    HPI: Jigar is a 62 year old male who is here for follow-up of a bone lesion incidentally found on MRI in 2019 when he was having some back pain.  Because of the pandemic, he did not see any providers for quite a while.  He started experiencing recurrent back pain a few months ago.  Since his primary care physician retired, he needed to reestablish care for his back.  A repeat MRI was ordered.  They also wanted to follow-up on the right iliac wing bone lesion and have asked to reassess that.  He recently had bilateral S1 neural foramen injections a couple weeks ago with little relief for his back pain and radiating leg pain.  He has known lumbar arthritis and has a history of previous L3-L4 complete laminectomy and L2 partial laminectomy.  He reports he is otherwise doing well.  He has no pain with sitting, just pain with prolonged walking and going up and down stairs.  No other concerns    Physical Exam: Jigar is a pleasant 62-year-old male who is alert and oriented no apparent distress.  He has a nonantalgic gait without gait assistance today.    Imaging: On 1/7/2022, he had an MRI of the pelvis with and without contrast which showed:  IMPRESSION:   1. Resolution of the previously seen lesion in the right iliac bone since 02/05/2019.   2. Moderate degenerative changes in the sacroiliac joints.   3. Mild degenerative changes in the bilateral hips with no synovitis.   4. Mild strain in the paraspinal muscles on left.   5. Mild gluteus minimus tendinosis bilaterally.    On 1/5/2022, he had a lumbar spine MRI without contrast which showed:  1. Multilevel degenerative changes throughout the lumbar spine    Impression: 62-year-old male with previous right iliac bone lesion, now completely resolved    Plan: We explained to Jigar that the lesion is no longer visible on MRI.  We reviewed the scans very thoroughly.  We cannot explain what the lesion was or why it has  resolved, but we have no concerns about this lesion any longer.  He does not need any further follow-up for the lesion.  He should continue to see his providers in regards to treatment for his low back pain and spinal stenosis.  He understands and agrees with the plan.  All questions have been answered.  Patient was also examined by Dr. Fields, and he agrees with the plan of care.

## 2022-02-13 ENCOUNTER — HEALTH MAINTENANCE LETTER (OUTPATIENT)
Age: 62
End: 2022-02-13

## 2022-04-04 SDOH — ECONOMIC STABILITY: INCOME INSECURITY: HOW HARD IS IT FOR YOU TO PAY FOR THE VERY BASICS LIKE FOOD, HOUSING, MEDICAL CARE, AND HEATING?: PATIENT DECLINED

## 2022-04-04 SDOH — ECONOMIC STABILITY: FOOD INSECURITY: WITHIN THE PAST 12 MONTHS, THE FOOD YOU BOUGHT JUST DIDN'T LAST AND YOU DIDN'T HAVE MONEY TO GET MORE.: PATIENT DECLINED

## 2022-04-04 SDOH — ECONOMIC STABILITY: FOOD INSECURITY: WITHIN THE PAST 12 MONTHS, YOU WORRIED THAT YOUR FOOD WOULD RUN OUT BEFORE YOU GOT MONEY TO BUY MORE.: PATIENT DECLINED

## 2022-04-04 SDOH — ECONOMIC STABILITY: TRANSPORTATION INSECURITY
IN THE PAST 12 MONTHS, HAS LACK OF TRANSPORTATION KEPT YOU FROM MEETINGS, WORK, OR FROM GETTING THINGS NEEDED FOR DAILY LIVING?: PATIENT DECLINED

## 2022-04-04 SDOH — HEALTH STABILITY: PHYSICAL HEALTH: ON AVERAGE, HOW MANY MINUTES DO YOU ENGAGE IN EXERCISE AT THIS LEVEL?: 150+ MIN

## 2022-04-04 SDOH — HEALTH STABILITY: PHYSICAL HEALTH: ON AVERAGE, HOW MANY DAYS PER WEEK DO YOU ENGAGE IN MODERATE TO STRENUOUS EXERCISE (LIKE A BRISK WALK)?: 2 DAYS

## 2022-04-04 SDOH — ECONOMIC STABILITY: TRANSPORTATION INSECURITY
IN THE PAST 12 MONTHS, HAS THE LACK OF TRANSPORTATION KEPT YOU FROM MEDICAL APPOINTMENTS OR FROM GETTING MEDICATIONS?: PATIENT DECLINED

## 2022-04-04 SDOH — ECONOMIC STABILITY: INCOME INSECURITY: IN THE LAST 12 MONTHS, WAS THERE A TIME WHEN YOU WERE NOT ABLE TO PAY THE MORTGAGE OR RENT ON TIME?: PATIENT REFUSED

## 2022-04-04 ASSESSMENT — SOCIAL DETERMINANTS OF HEALTH (SDOH)
HOW OFTEN DO YOU GET TOGETHER WITH FRIENDS OR RELATIVES?: ONCE A WEEK
DO YOU BELONG TO ANY CLUBS OR ORGANIZATIONS SUCH AS CHURCH GROUPS UNIONS, FRATERNAL OR ATHLETIC GROUPS, OR SCHOOL GROUPS?: PATIENT DECLINED
IN A TYPICAL WEEK, HOW MANY TIMES DO YOU TALK ON THE PHONE WITH FAMILY, FRIENDS, OR NEIGHBORS?: MORE THAN THREE TIMES A WEEK
HOW OFTEN DO YOU ATTEND CHURCH OR RELIGIOUS SERVICES?: PATIENT DECLINED

## 2022-04-04 ASSESSMENT — LIFESTYLE VARIABLES
HOW OFTEN DO YOU HAVE SIX OR MORE DRINKS ON ONE OCCASION: LESS THAN MONTHLY
HOW MANY STANDARD DRINKS CONTAINING ALCOHOL DO YOU HAVE ON A TYPICAL DAY: 1 OR 2
HOW OFTEN DO YOU HAVE A DRINK CONTAINING ALCOHOL: 2-3 TIMES A WEEK

## 2022-04-06 ENCOUNTER — OFFICE VISIT (OUTPATIENT)
Dept: FAMILY MEDICINE | Facility: CLINIC | Age: 62
End: 2022-04-06
Payer: COMMERCIAL

## 2022-04-06 VITALS
SYSTOLIC BLOOD PRESSURE: 117 MMHG | WEIGHT: 225 LBS | RESPIRATION RATE: 14 BRPM | TEMPERATURE: 98 F | HEART RATE: 58 BPM | HEIGHT: 69 IN | BODY MASS INDEX: 33.33 KG/M2 | DIASTOLIC BLOOD PRESSURE: 74 MMHG

## 2022-04-06 DIAGNOSIS — Z01.818 PREOP GENERAL PHYSICAL EXAM: Primary | ICD-10-CM

## 2022-04-06 DIAGNOSIS — Z71.89 ADVANCED CARE PLANNING/COUNSELING DISCUSSION: ICD-10-CM

## 2022-04-06 DIAGNOSIS — M54.50 LUMBAR BACK PAIN: ICD-10-CM

## 2022-04-06 PROBLEM — E11.9 TYPE 2 DIABETES MELLITUS WITHOUT COMPLICATION (H): Status: RESOLVED | Noted: 2018-03-16 | Resolved: 2022-04-06

## 2022-04-06 PROBLEM — E66.01 MORBID OBESITY (H): Status: RESOLVED | Noted: 2018-03-15 | Resolved: 2022-04-06

## 2022-04-06 LAB
ANION GAP SERPL CALCULATED.3IONS-SCNC: 4 MMOL/L (ref 3–14)
BUN SERPL-MCNC: 18 MG/DL (ref 7–30)
CALCIUM SERPL-MCNC: 8.9 MG/DL (ref 8.5–10.1)
CHLORIDE BLD-SCNC: 107 MMOL/L (ref 94–109)
CO2 SERPL-SCNC: 28 MMOL/L (ref 20–32)
CREAT SERPL-MCNC: 0.7 MG/DL (ref 0.66–1.25)
ERYTHROCYTE [DISTWIDTH] IN BLOOD BY AUTOMATED COUNT: 13.6 % (ref 10–15)
GFR SERPL CREATININE-BSD FRML MDRD: >90 ML/MIN/1.73M2
GLUCOSE BLD-MCNC: 101 MG/DL (ref 70–99)
HBA1C MFR BLD: 5.5 % (ref 0–5.6)
HCT VFR BLD AUTO: 42.9 % (ref 40–53)
HGB BLD-MCNC: 14.2 G/DL (ref 13.3–17.7)
MCH RBC QN AUTO: 30.3 PG (ref 26.5–33)
MCHC RBC AUTO-ENTMCNC: 33.1 G/DL (ref 31.5–36.5)
MCV RBC AUTO: 92 FL (ref 78–100)
PLATELET # BLD AUTO: 197 10E3/UL (ref 150–450)
POTASSIUM BLD-SCNC: 4.2 MMOL/L (ref 3.4–5.3)
RBC # BLD AUTO: 4.68 10E6/UL (ref 4.4–5.9)
SODIUM SERPL-SCNC: 139 MMOL/L (ref 133–144)
WBC # BLD AUTO: 3.8 10E3/UL (ref 4–11)

## 2022-04-06 PROCEDURE — 80048 BASIC METABOLIC PNL TOTAL CA: CPT | Performed by: PHYSICIAN ASSISTANT

## 2022-04-06 PROCEDURE — 99214 OFFICE O/P EST MOD 30 MIN: CPT | Performed by: PHYSICIAN ASSISTANT

## 2022-04-06 PROCEDURE — 36415 COLL VENOUS BLD VENIPUNCTURE: CPT | Performed by: PHYSICIAN ASSISTANT

## 2022-04-06 PROCEDURE — 85027 COMPLETE CBC AUTOMATED: CPT | Performed by: PHYSICIAN ASSISTANT

## 2022-04-06 PROCEDURE — 83036 HEMOGLOBIN GLYCOSYLATED A1C: CPT | Performed by: PHYSICIAN ASSISTANT

## 2022-04-06 PROCEDURE — 93000 ELECTROCARDIOGRAM COMPLETE: CPT | Performed by: PHYSICIAN ASSISTANT

## 2022-04-06 RX ORDER — GABAPENTIN 300 MG/1
CAPSULE ORAL
COMMUNITY
Start: 2022-03-08 | End: 2022-04-06

## 2022-04-06 NOTE — PROGRESS NOTES
[facility]  [address] [address]  Phone: [phone]  Primary Provider: Joselito Lepe  Pre-op Performing Provider: CLIFTON CHILEL      PREOPERATIVE EVALUATION:  Today's date: 4/6/2022    Jigar Brooks is a 62 year old male who presents for a preoperative evaluation.    Surgical Information:  Surgery/Procedure: Decompression revision L3 to L5  Surgery Location: [facility], [facility]  Surgeon: Erasmo Fernández  Surgery Date: 4/18/22  Time of Surgery: 11:00am  Where patient plans to recover: At home with family  Fax number for surgical facility: [phone]    Type of Anesthesia Anticipated: General    Assessment & Plan     The proposed surgical procedure is considered INTERMEDIATE risk.    Preop general physical exam  - Basic metabolic panel  - CBC with platelets  - EKG 12-lead complete w/read - Clinics  - Hemoglobin A1c    Lumbar back pain      Advanced care planning/counseling discussion             Risks and Recommendations:  The patient has the following additional risks and recommendations for perioperative complications:   - No identified additional risk factors other than previously addressed    Medication Instructions:  Patient is to take all scheduled medications on the day of surgery EXCEPT for modifications listed below:   - ibuprofen (Advil, Motrin): HOLD 1 day before surgery.     RECOMMENDATION:  APPROVAL GIVEN to proceed with proposed procedure, without further diagnostic evaluation.                      Subjective     HPI related to upcoming procedure: Back pain    Preop Questions 4/4/2022   1. Have you ever had a heart attack or stroke? No   2. Have you ever had surgery on your heart or blood vessels, such as a stent placement, a coronary artery bypass, or surgery on an artery in your head, neck, heart, or legs? No   3. Do you have chest pain with activity? No   4. Do you have a history of  heart  failure? No   5. Do you currently have a cold, bronchitis or symptoms of other infection? No   6. Do you have a cough, shortness of breath, or wheezing? No   7. Do you or anyone in your family have previous history of blood clots? No   8. Do you or does anyone in your family have a serious bleeding problem such as prolonged bleeding following surgeries or cuts? No   9. Have you ever had problems with anemia or been told to take iron pills? No   10. Have you had any abnormal blood loss such as black, tarry or bloody stools? No   11. Have you ever had a blood transfusion? No   12. Are you willing to have a blood transfusion if it is medically needed before, during, or after your surgery? Yes   13. Have you or any of your relatives ever had problems with anesthesia? UNKNOWN -    14. Do you have sleep apnea, excessive snoring or daytime drowsiness? No   15. Do you have any artifical heart valves or other implanted medical devices like a pacemaker, defibrillator, or continuous glucose monitor? No   16. Do you have artificial joints? YES - both knees   17. Are you allergic to latex? No       Health Care Directive:  Patient does not have a Health Care Directive or Living Will: Discussed advance care planning with patient; however, patient declined at this time.    Preoperative Review of :   reviewed - no record of controlled substances prescribed.      Status of Chronic Conditions:  See problem list for active medical problems.  Problems all longstanding and stable, except as noted/documented.  See ROS for pertinent symptoms related to these conditions.      Review of Systems  Constitutional, neuro, ENT, endocrine, pulmonary, cardiac, gastrointestinal, genitourinary, musculoskeletal, integument and psychiatric systems are negative, except as otherwise noted.    Patient Active Problem List    Diagnosis Date Noted     Pain in joint, lower leg 03/03/2011     Priority: Medium     Knee joint replacement by other means  "03/03/2011     Priority: Medium     Obesity 09/02/2010     Priority: Medium     OA (osteoarthritis) of knee 11/23/2009     Priority: Medium      Past Medical History:   Diagnosis Date     Arthritis     Knees, Spine     Other and unspecified disc disorder of lumbar region     L4-L5     Past Surgical History:   Procedure Laterality Date     BACK SURGERY      Laminectomy, Decompression L3-L5     carpal tunnel surgery right       HEAD & NECK SURGERY      Discectomy, Graphing     SOFT TISSUE SURGERY      Carpal Tunnel Right Hand     SURGICAL HISTORY OF -       discectomy cervical     ZZC ANESTH,KNEE JOINT,CLOSED PROCEDURE      right and left     ZZC REPAIR CRUCIATE LIGAMENT,KNEE      right     No current outpatient medications on file.       No Known Allergies     Social History     Tobacco Use     Smoking status: Former Smoker     Smokeless tobacco: Never Used     Tobacco comment: Occ cigar   Substance Use Topics     Alcohol use: Yes     Comment: A few drinks per week       History   Drug Use No         Objective     /74 (BP Location: Right arm, Patient Position: Chair, Cuff Size: Adult Large)   Pulse 58   Temp 98  F (36.7  C) (Oral)   Resp 14   Ht 1.74 m (5' 8.5\")   Wt 102.1 kg (225 lb)   BMI 33.71 kg/m      Physical Exam    GENERAL APPEARANCE: healthy, alert and no distress     EYES: EOMI,  PERRL     HENT: ear canals and TM's normal and nose and mouth without ulcers or lesions     NECK: no adenopathy, no asymmetry, masses, or scars and thyroid normal to palpation     RESP: lungs clear to auscultation - no rales, rhonchi or wheezes     CV: regular rates and rhythm, normal S1 S2, no S3 or S4 and no murmur, click or rub     ABDOMEN:  soft, nontender, no HSM or masses and bowel sounds normal     MS: extremities normal- no gross deformities noted, no evidence of inflammation in joints, FROM in all extremities.     SKIN: no suspicious lesions or rashes     NEURO: Normal strength and tone, sensory exam grossly " normal, mentation intact and speech normal     PSYCH: mentation appears normal. and affect normal/bright     LYMPHATICS: No cervical adenopathy    No results for input(s): HGB, PLT, INR, NA, POTASSIUM, CR, A1C in the last 49022 hours.     Diagnostics:  Recent Results (from the past 168 hour(s))   CBC with platelets    Collection Time: 04/06/22 10:28 AM   Result Value Ref Range    WBC Count 3.8 (L) 4.0 - 11.0 10e3/uL    RBC Count 4.68 4.40 - 5.90 10e6/uL    Hemoglobin 14.2 13.3 - 17.7 g/dL    Hematocrit 42.9 40.0 - 53.0 %    MCV 92 78 - 100 fL    MCH 30.3 26.5 - 33.0 pg    MCHC 33.1 31.5 - 36.5 g/dL    RDW 13.6 10.0 - 15.0 %    Platelet Count 197 150 - 450 10e3/uL   Hemoglobin A1c    Collection Time: 04/06/22 10:28 AM   Result Value Ref Range    Hemoglobin A1C 5.5 0.0 - 5.6 %      EKG: sinus bradycardia, normal axis, normal intervals, no acute ST/T changes c/w ischemia, no LVH by voltage criteria, unchanged from previous tracings    Revised Cardiac Risk Index (RCRI):  The patient has the following serious cardiovascular risks for perioperative complications:   - No serious cardiac risks = 0 points     RCRI Interpretation: 0 points: Class I (very low risk - 0.4% complication rate)           Signed Electronically by: Tamica Méndez PA-C  Copy of this evaluation report is provided to requesting physician.

## 2022-09-08 ENCOUNTER — OFFICE VISIT (OUTPATIENT)
Dept: URGENT CARE | Facility: URGENT CARE | Age: 62
End: 2022-09-08

## 2022-09-08 ENCOUNTER — ANCILLARY PROCEDURE (OUTPATIENT)
Dept: GENERAL RADIOLOGY | Facility: CLINIC | Age: 62
End: 2022-09-08
Attending: PHYSICIAN ASSISTANT
Payer: COMMERCIAL

## 2022-09-08 VITALS
BODY MASS INDEX: 33.11 KG/M2 | HEART RATE: 66 BPM | SYSTOLIC BLOOD PRESSURE: 118 MMHG | DIASTOLIC BLOOD PRESSURE: 66 MMHG | TEMPERATURE: 98.5 F | OXYGEN SATURATION: 95 % | RESPIRATION RATE: 14 BRPM | WEIGHT: 221 LBS

## 2022-09-08 DIAGNOSIS — J18.9 PNEUMONIA OF RIGHT UPPER LOBE DUE TO INFECTIOUS ORGANISM: Primary | ICD-10-CM

## 2022-09-08 DIAGNOSIS — R50.9 FEVER, UNSPECIFIED FEVER CAUSE: ICD-10-CM

## 2022-09-08 DIAGNOSIS — R05.9 COUGH: ICD-10-CM

## 2022-09-08 LAB
BASOPHILS # BLD AUTO: 0 10E3/UL (ref 0–0.2)
BASOPHILS NFR BLD AUTO: 0 %
EOSINOPHIL # BLD AUTO: 0.3 10E3/UL (ref 0–0.7)
EOSINOPHIL NFR BLD AUTO: 3 %
ERYTHROCYTE [DISTWIDTH] IN BLOOD BY AUTOMATED COUNT: 14.1 % (ref 10–15)
ERYTHROCYTE [SEDIMENTATION RATE] IN BLOOD BY WESTERGREN METHOD: 54 MM/HR (ref 0–20)
HCT VFR BLD AUTO: 41.3 % (ref 40–53)
HGB BLD-MCNC: 13.2 G/DL (ref 13.3–17.7)
LYMPHOCYTES # BLD AUTO: 1 10E3/UL (ref 0.8–5.3)
LYMPHOCYTES NFR BLD AUTO: 12 %
MCH RBC QN AUTO: 28.8 PG (ref 26.5–33)
MCHC RBC AUTO-ENTMCNC: 32 G/DL (ref 31.5–36.5)
MCV RBC AUTO: 90 FL (ref 78–100)
MONOCYTES # BLD AUTO: 0.7 10E3/UL (ref 0–1.3)
MONOCYTES NFR BLD AUTO: 8 %
NEUTROPHILS # BLD AUTO: 6.8 10E3/UL (ref 1.6–8.3)
NEUTROPHILS NFR BLD AUTO: 77 %
PLATELET # BLD AUTO: 260 10E3/UL (ref 150–450)
RBC # BLD AUTO: 4.59 10E6/UL (ref 4.4–5.9)
WBC # BLD AUTO: 8.8 10E3/UL (ref 4–11)

## 2022-09-08 PROCEDURE — 85025 COMPLETE CBC W/AUTO DIFF WBC: CPT | Performed by: PHYSICIAN ASSISTANT

## 2022-09-08 PROCEDURE — 85652 RBC SED RATE AUTOMATED: CPT | Performed by: PHYSICIAN ASSISTANT

## 2022-09-08 PROCEDURE — 99214 OFFICE O/P EST MOD 30 MIN: CPT | Performed by: PHYSICIAN ASSISTANT

## 2022-09-08 PROCEDURE — 71046 X-RAY EXAM CHEST 2 VIEWS: CPT | Mod: TC | Performed by: RADIOLOGY

## 2022-09-08 PROCEDURE — 36415 COLL VENOUS BLD VENIPUNCTURE: CPT | Performed by: PHYSICIAN ASSISTANT

## 2022-09-08 RX ORDER — BENZONATATE 200 MG/1
200 CAPSULE ORAL 3 TIMES DAILY PRN
Qty: 30 CAPSULE | Refills: 0 | Status: SHIPPED | OUTPATIENT
Start: 2022-09-08

## 2022-09-08 RX ORDER — AZITHROMYCIN 250 MG/1
TABLET, FILM COATED ORAL
Qty: 6 TABLET | Refills: 0 | Status: SHIPPED | OUTPATIENT
Start: 2022-09-08 | End: 2022-09-13

## 2022-09-08 NOTE — PROGRESS NOTES
Assessment & Plan     Pneumonia of right upper lobe due to infectious organism  Findings on chest radiograph and elevated ESR consistent with possible early bacterial pneumonia.  As such we will treat with antibiotics at this time.  Also provided antitussive for symptomatic management.  Advised follow-up with primary care provider if no improvement in symptoms over the course of the next week.  Discussed reasons for which to return more urgently should symptoms be worsening.  - CBC with platelets and differential  - ESR: Erythrocyte sedimentation rate  - XR Chest 2 Views  - CBC with platelets and differential  - ESR: Erythrocyte sedimentation rate  - amoxicillin-clavulanate (AUGMENTIN) 875-125 MG tablet  Dispense: 14 tablet; Refill: 0  - azithromycin (ZITHROMAX) 250 MG tablet  Dispense: 6 tablet; Refill: 0  - benzonatate (TESSALON) 200 MG capsule  Dispense: 30 capsule; Refill: 0     I spent a total of 20 minutes on the day of the visit.   Time spent doing chart review, history and exam, documentation and further activities per the note    Return in about 1 week (around 9/15/2022) for reevaluation with PCP if symptoms not improving, return earlier if symptoms are worsening.    Giuliano Shin PA-C  [facility]       Shaka Kimball is a 62 year old male who presents to clinic today for the following health issues:  Chief Complaint   Patient presents with     Sick     ------POSITIVE covid 19 on 9/4  home test ------Situation/Background (brief explanation of current symptoms/situation): Patient calling, states he tested positive for Covid on 9/4, initial symptoms started 10 days ago. Currently experiencing sinus congestion, fatigue, cough, intermittent nose bleeding, and a fever of 101.8 with sweats. Cough is dry at this point, was previously productive, patient is coughing more when laying down. No shortness of breath or chest pain/pressure noted  -- took sudafed     HPI  Patient is a  62-year-old male without significant history presenting to urgent care for evaluation of fever and cough.  Patient developed upper respiratory infectious symptoms approximately 10 days ago and subsequently had a positive home COVID test 4 days ago.  Patient notes that initially had more severe symptoms, had significant amount of sinus congestion and pressure.  Majority of the symptoms have seemed to resolve.  Did have some bilateral low back pain as well that has since resolved.  Now only notes a persistent cough that seems to be worse when laying down.  Congestion and rhinorrhea nearly resolved.  Notes having fever daily that is intermittent.  Has been treating at home with over-the-counter antipyretics.  States that he took 1000 mg of ibuprofen around 11 AM today, on arrival in urgent care no fever.  Notes highest temperature at home has been 102.8  F.  Denies any headache, neck pain, sore throat, ear pain, chest pain, dyspnea, abdominal pain, urinary symptoms, GI symptoms, skin changes or rash, lightheadedness, or other complaints.    Review of Systems  Focused ROS obtained, pertinent positives/negatives reviewed in the HPI.       Objective    /66 (BP Location: Right arm, Patient Position: Chair, Cuff Size: Adult Regular)   Pulse 66   Temp 98.5  F (36.9  C) (Oral)   Resp 14   Wt 100.2 kg (221 lb)   SpO2 95%   BMI 33.11 kg/m    Physical Exam   GENERAL: healthy, alert and no distress  HENT: normal cephalic/atraumatic, ear canals and TM's normal, nose and mouth without ulcers or lesions, oropharynx clear and oral mucous membranes moist  NECK: no adenopathy  RESP: lungs clear to auscultation - no rales, rhonchi or wheezes  CV: regular rates and rhythm, peripheral pulses strong and no peripheral edema  SKIN: no suspicious lesions or rashes      Narrative & Impression   EXAM: XR CHEST 2 VIEWS  LOCATION: [facility]  DATE/TIME: 9/8/2022 7:09 PM     INDICATION: Persistent cough and  fever. Positive COVID test at home.  COMPARISON: None.                                                                      IMPRESSION: Small streaky airspace in the peripheral right upper lobe could represent a small focus of infection. Faint nodular opacities overlying the lung bases have no correlates on the lateral view and are favored to relate to nipple shadows and/or   other overlapping structures. No pleural effusions or pneumothorax. Nonenlarged cardiac silhouette.         Results for orders placed or performed in visit on 09/08/22 (from the past 24 hour(s))   CBC with platelets and differential    Narrative    The following orders were created for panel order CBC with platelets and differential.  Procedure                               Abnormality         Status                     ---------                               -----------         ------                     CBC with platelets and d...[[id number]]  Abnormal            Final result                 Please view results for these tests on the individual orders.   ESR: Erythrocyte sedimentation rate   Result Value Ref Range    Erythrocyte Sedimentation Rate 54 (H) 0 - 20 mm/hr   CBC with platelets and differential   Result Value Ref Range    WBC Count 8.8 4.0 - 11.0 10e3/uL    RBC Count 4.59 4.40 - 5.90 10e6/uL    Hemoglobin 13.2 (L) 13.3 - 17.7 g/dL    Hematocrit 41.3 40.0 - 53.0 %    MCV 90 78 - 100 fL    MCH 28.8 26.5 - 33.0 pg    MCHC 32.0 31.5 - 36.5 g/dL    RDW 14.1 10.0 - 15.0 %    Platelet Count 260 150 - 450 10e3/uL    % Neutrophils 77 %    % Lymphocytes 12 %    % Monocytes 8 %    % Eosinophils 3 %    % Basophils 0 %    Absolute Neutrophils 6.8 1.6 - 8.3 10e3/uL    Absolute Lymphocytes 1.0 0.8 - 5.3 10e3/uL    Absolute Monocytes 0.7 0.0 - 1.3 10e3/uL    Absolute Eosinophils 0.3 0.0 - 0.7 10e3/uL    Absolute Basophils 0.0 0.0 - 0.2 10e3/uL

## 2022-09-09 NOTE — PATIENT INSTRUCTIONS
Use medication as directed.  Follow-up with primary care provider if no improvement in symptoms over the course of the next several days.  Return more urgently for any significant worsening of symptoms.

## 2022-10-15 ENCOUNTER — HEALTH MAINTENANCE LETTER (OUTPATIENT)
Age: 62
End: 2022-10-15

## 2023-03-26 ENCOUNTER — HEALTH MAINTENANCE LETTER (OUTPATIENT)
Age: 63
End: 2023-03-26

## 2023-05-11 ENCOUNTER — OFFICE VISIT (OUTPATIENT)
Dept: URGENT CARE | Facility: URGENT CARE | Age: 63
End: 2023-05-11
Payer: COMMERCIAL

## 2023-05-11 VITALS
DIASTOLIC BLOOD PRESSURE: 73 MMHG | SYSTOLIC BLOOD PRESSURE: 118 MMHG | RESPIRATION RATE: 20 BRPM | TEMPERATURE: 101.2 F | OXYGEN SATURATION: 97 % | HEART RATE: 88 BPM

## 2023-05-11 DIAGNOSIS — B02.9 HERPES ZOSTER WITHOUT COMPLICATION: Primary | ICD-10-CM

## 2023-05-11 PROCEDURE — 99213 OFFICE O/P EST LOW 20 MIN: CPT | Performed by: FAMILY MEDICINE

## 2023-05-11 PROCEDURE — 87798 DETECT AGENT NOS DNA AMP: CPT | Performed by: FAMILY MEDICINE

## 2023-05-11 RX ORDER — VALACYCLOVIR HYDROCHLORIDE 1 G/1
1000 TABLET, FILM COATED ORAL 3 TIMES DAILY
Qty: 30 TABLET | Refills: 0 | Status: SHIPPED | OUTPATIENT
Start: 2023-05-11 | End: 2023-05-21

## 2023-05-11 NOTE — PROGRESS NOTES
ASSESSMENT/  PLAN:  Herpes zoster without complication     - valACYclovir (VALTREX) 1000 mg tablet; Take 1 tablet (1,000 mg) by mouth 3 times daily for 10 days  - Varicella Zoster DNA PCR CSF or Skin Swab; Future  - Varicella Zoster DNA PCR CSF or Skin Swab       We discussed that the antiviral medications can decrease the severity of the zoster attack and reduce the risk of post -herpetic neuralgia when started promptly at the start of zoster symptoms, and that a delay in starting treatment produces less favorable results.  .  I advised the patient that zoster is chicken pox virus, and that  unvaccinated small children, pregnant women and adults who have not had chicken pox should be avoided until all skin lesions have dried and scabbed over  to prevent transmission of the disease.      Follow-up with primary clinic if not improving     May take acetaminophen / ibuprofen as an alternative for pain    ---------------------------------------------------------------------------------------------------------------------------------------------------------------------    SUBJECTIVE:  Chief Complaint   Patient presents with     Urgent Care     Derm Problem     Blisters on right side from buttocks area down to foot-Also some Chills and feels feverish-Patient noted he was fly fishing-Possibly happened 4-5 days ago-OTC Cortisone 10     Jigar Brooks is a 63 year old male  who presents to the clinic today for a  painful area of skin with  rash.  Onset   was 5 day(s) ago and  is gradual onset, still present, worsening and constant.    Location  : right buttocks, foot, lower leg and thigh.  Quality/symptoms : painful, red and blistering   Symptoms are moderate and the affected skin seems to be worsening.  Previous history of a similar symptoms? No  Recent exposure history: happened on trip fly fishing-  He suspected exposure to a poisonous plant     Associated symptoms include: fever.    Past Medical History:   Diagnosis  Date     Arthritis     Knees, Spine     Other and unspecified disc disorder of lumbar region     L4-L5     Type 2 diabetes mellitus without complication (H)      Patient Active Problem List   Diagnosis     OA (osteoarthritis) of knee     Obesity     Pain in joint, lower leg     Knee joint replacement by other means       ALLERGIES:  Patient has no known allergies.    benzonatate (TESSALON) 200 MG capsule, Take 1 capsule (200 mg) by mouth 3 times daily as needed for cough    No current facility-administered medications on file prior to visit.      Social History     Tobacco Use     Smoking status: Former     Smokeless tobacco: Never     Tobacco comments:     Occ cigar   Vaping Use     Vaping status: Never Used   Substance Use Topics     Alcohol use: Yes     Comment: A few drinks per week       Family History   Problem Relation Age of Onset     Cerebrovascular Disease Mother      Breast Cancer Mother      Obesity Mother      Diabetes Father      Heart Disease Father         afib at age 81     Obesity Father      Obesity Sister      Obesity Sister      Obesity Brother      C.A.D. Paternal Grandfather          age 58     Cancer - colorectal No family hx of      Prostate Cancer No family hx of          ROS:  CONSTITUTIONAL:  fever, chills,    EYES: NEGATIVE for vision changes or irritation  ENT/MOUTH: NEGATIVE for ear, mouth and throat problems  RESP:NEGATIVE for significant cough or SOB    EXAM:   /73   Pulse 88   Temp (!) 101.2  F (38.4  C) (Oral)   Resp 20   SpO2 97%   GENERAL: alert, mild distress.  SKIN: Rash description:    Distribution: dermatomal  Location:  right buttocks, foot, lower leg and thigh    Color: red,  Lesion type: vesicular, blotchy, confluent with inflammation and crusting      EYES: EOMI,  PERRL, conjunctiva clear  NECK: supple, non-tender to palpation, no adenopathy noted  ,RESP: lungs clear to auscultation - no rales, rhonchi or wheezes  CV: regular rates and rhythm, normal S1 S2,  no murmur noted  MS:extremities normal- no gross deformities noted,  FROM noted in all extremities  NEURO: Normal strength and tone, sensory exam grossly normal,  normal speech and mentation

## 2023-05-12 ENCOUNTER — TELEPHONE (OUTPATIENT)
Dept: URGENT CARE | Facility: URGENT CARE | Age: 63
End: 2023-05-12
Payer: COMMERCIAL

## 2023-05-12 DIAGNOSIS — B02.9 HERPES ZOSTER WITHOUT COMPLICATION: Primary | ICD-10-CM

## 2023-05-12 LAB — VZV DNA SPEC QL NAA+PROBE: DETECTED

## 2023-05-12 RX ORDER — OXYCODONE HYDROCHLORIDE 5 MG/1
5 TABLET ORAL EVERY 8 HOURS PRN
Qty: 10 TABLET | Refills: 0 | Status: SHIPPED | OUTPATIENT
Start: 2023-05-12 | End: 2023-05-16

## 2023-05-12 NOTE — TELEPHONE ENCOUNTER
Message routed to the Urgent Care providers on staff today.     Kellie Lester MA on 5/12/2023 at 11:03 AM

## 2023-05-12 NOTE — TELEPHONE ENCOUNTER
Patient notified.    Patient wants to know if there is any concern of infection getting to his knees since he had knee replacement?     Patient also wants to know if he can apply calamine lotion to shingles?        Kellie Lester MA on 5/12/2023 at 2:26 PM

## 2023-05-12 NOTE — TELEPHONE ENCOUNTER
Attempted to call patient. No answer.   Oxycodone sent in, to use for more severe pain. Can continue with Tylenol and ibuprofen also.     Please call patient back and notify.     Anupama Ernandez PA-C

## 2023-05-12 NOTE — TELEPHONE ENCOUNTER
Pt calling regarding his lab results and needing something for his shingles pain. Pt was seen in UC yesterday and diagnosed with shingles. He is requesting something stronger than ibuprofen to be sent to his pharmacy.  Varicella zoster test is positive    Merry BARRIOS RN, BSN

## 2023-12-18 ENCOUNTER — TELEPHONE (OUTPATIENT)
Dept: ORTHOPEDICS | Facility: CLINIC | Age: 63
End: 2023-12-18
Payer: COMMERCIAL

## 2023-12-20 ENCOUNTER — TRANSCRIBE ORDERS (OUTPATIENT)
Dept: ORTHOPEDICS | Facility: CLINIC | Age: 63
End: 2023-12-20
Payer: COMMERCIAL

## 2023-12-20 DIAGNOSIS — M89.9 BONE LESION: Primary | ICD-10-CM

## 2023-12-21 NOTE — TELEPHONE ENCOUNTER
Action December 20, 2023 8:57 PM MT   Action Taken Sent a request for imaging from .       DIAGNOSIS: Bilateral Foot/Ankle   APPOINTMENT DATE: 01/12/2024   NOTES STATUS DETAILS   OFFICE NOTE from referring provider Yong Schmid MD - [facility]   OFFICE NOTE from other specialist Care Everywhere 09/21/2023 - Mikael Art MD Ortho   DISCHARGE REPORT from the ER Care Everywhere 09/21/2023 - [facility]   MEDICATION LIST Care Everywhere  Internal    INJECTIONS DONE IN RADIOLOGY In PACS HP:   MRI In PACS HP:   CT SCAN In PACS HP:   XRAYS (IMAGES & REPORTS) In PACS HP:

## 2024-01-12 ENCOUNTER — PRE VISIT (OUTPATIENT)
Dept: ORTHOPEDICS | Facility: CLINIC | Age: 64
End: 2024-01-12

## 2024-01-12 ENCOUNTER — ANCILLARY PROCEDURE (OUTPATIENT)
Dept: GENERAL RADIOLOGY | Facility: CLINIC | Age: 64
End: 2024-01-12
Attending: ORTHOPAEDIC SURGERY
Payer: COMMERCIAL

## 2024-01-12 ENCOUNTER — OFFICE VISIT (OUTPATIENT)
Dept: ORTHOPEDICS | Facility: CLINIC | Age: 64
End: 2024-01-12
Payer: COMMERCIAL

## 2024-01-12 VITALS — WEIGHT: 250 LBS | HEIGHT: 68 IN | BODY MASS INDEX: 37.89 KG/M2

## 2024-01-12 DIAGNOSIS — M89.9 BONE LESION: ICD-10-CM

## 2024-01-12 DIAGNOSIS — M76.71 PERONEAL TENDONITIS, RIGHT: Primary | ICD-10-CM

## 2024-01-12 PROCEDURE — 73610 X-RAY EXAM OF ANKLE: CPT | Mod: LT | Performed by: RADIOLOGY

## 2024-01-12 PROCEDURE — 99214 OFFICE O/P EST MOD 30 MIN: CPT | Performed by: ORTHOPAEDIC SURGERY

## 2024-01-12 PROCEDURE — 73630 X-RAY EXAM OF FOOT: CPT | Mod: RT | Performed by: RADIOLOGY

## 2024-01-12 NOTE — PROGRESS NOTES
Orthopaedic Surgery Clinic - New Patient    Chief Complaint:  Right lateral hindfoot pain.    History:  I had the opportunity to meet this very pleasant 64-year-old patient today as new patient to me at confirmatory consultation at the patient's request for evaluation and management of a painful right hindfoot of chronic duration.  The patient reports onset of pain sometime around 2019 with a reported history of golfing around that time, pushing off with the right foot, and feeling a sudden pain and swelling in the right lateral ankle and hindfoot.  He saw Dr. Art at [facility].  The patient more recently saw Mandeep Askew PA-C working with Dr. Art, on 09/21/2023.  He reports he has subsequently had worsening of his symptoms since about last Thanksgiving while bird hunting.  The patient reports having had a series of approximately 5 corticosteroid injections under ultrasound guidance in the right peroneal tendon sheath.  The patient reports that the results after the injections appear to have mostly followed a pattern of an initial period of about a week before any pain relief followed by a temporary period of close to 100% pain relief of the pre-injection symptoms.  He reports multiple episodes since the onset of his symptoms, of right ankle instability which from his verbal and demonstrative description appear to have been eversion rather than inversion.  He denies having tried physical therapy, custom orthotics, or topical medications, but does report having tried NSAIDs and icing.  He reports his pain level is about 3 out of 10 in severity at the time being but it can wax and wane with activity.  He is an active outdoorsman and is an [place] [facility] and in [place] as well as bird [place].  He states he had discussed the option for right peroneus longus surgery with Dr. Art's office and has elected to not proceed with him at this point in time.  He reports similar symptoms in the  "left hindfoot as well, though more severe in the right.    PMH:  The patient's past medical history is reviewed with the patient and in the medical record.  He is otherwise generally healthy.  Social history:  He does smoke occasional cigars; he denies tobacco use otherwise.  Allergies:  No known drug allergies.  Medications:  His medication list is reviewed and includes benzonatate and Valtrex (he had a recent episode of shingles).  Past surgical history:  He has had bilateral TKA's with Dr. Verma at [facility], and reports he has also had spine and neck surgery.    Examination:  Recorded Estimated body mass index is 38.01 kg/m  as calculated from the following:    Height as of this encounter: 1.727 m (5' 8\").    Weight as of this encounter: 113.4 kg (250 lb).  Examination today shows the patient to be a pleasant, cooperative, awake, and alert adult sitting upright in a regular chair in no acute distress.  Normal shoewear, removed examination on both feet.  No assistive gait devices are seen.  Respirations are regular and nonlabored on room air.  Skin on the right ankle and foot is intact without visible open wounds, ulcerations, or any skin that appears to be at immediate obvious risk.  No visible ecchymosis or erythema.  3/4 easily palpable right DP and PT pulses.  Light touch sensation is endorsed as intact to light touch in all dermatomes.  5/5 right tib ant, EHL, gastrocsoleus, PTT, and peroneal strength.  No demonstrable subluxation or dislocation of the peroneal tendons.  Resisted eversion strength appears to reproduce right lateral hindfoot pain.  There is tenderness to palpation over the peroneal tendons from just posterior and distal to the distal fibula along the lateral hindfoot towards both the region around the plantar aspect of the cuboid as well as the fifth metatarsal base, both about equally.  The posterior heel is nontender to palpation.  There is no palpable defect in the Achilles.  Matthews's test " shows continuity of the triceps surae mechanism.  The plantar fascia, direct subcalcaneal region, and first metatarsal are all nontender to palpation.  The fifth metatarsal base is tender to palpation; the fifth metatarsal shaft and head are nontender.  Nontender to palpation at the dorsal midfoot including the Lisfranc joint.  Anterior drawer test is 0.  Varus tilt test shows no obvious instability.  No tenderness over the lateral ankle ligaments.  Standing examination suggests a the presence of a mild symmetric bilateral peekaboo heel sign.  There is a slight clinically apparent varus deformity to the bilateral heels which is also symmetric.  Bryan block test does appear to correct this deformity to neutral.  He does appear to have the clinical appearance of a symmetric bilateral foot cavus arch.  Fixed right hallux IP joint plantarflexion contracture.    Imaging:  Independent review of imaging was performed including weightbearing bilateral AP and mortise ankle radiographs and weightbearing bilateral AP, oblique, and lateral foot radiographs dated today, 01/12/2024.  Images were discussed with the patient.  There does appear to be symmetric metatarsus adductus present with approximate Sgarl[place] angle 21 degrees on the right and 25 degrees on the left.  Despite the clinical appearance of bilateral cavus foot, the longitudinal arch appears to be relatively neutral with an approximate Meary's angle of 0 degrees on the left and 5 degrees apex dorsal on the right and with an approximate calcaneal pitch of 20 degrees bilaterally.  No obvious evidence for tumor, infection, or acute fracture.  Relatively mild insertional Achilles enthesopathy bilaterally, left worse than right.  Plantar calcaneal enthesopathy bilaterally.  Right claw hallux.  Ankle mortises are congruent bilaterally without evidence for medial clear space widening or syndesmotic widening.  Suggestion of a lateral talar dome lucency in the left ankle.   Os peroneum present bilaterally.    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  This could include tendinosis and/or tearing.  Bilateral metatarsus adductus.  Bilateral cavus foot.    Plan:  The findings, diagnoses, and treatment options, both nonsurgical and surgical, were discussed with the patient in layman's terms.  Full opportunity was given to him to participate in the shared decision-making process.  I discussed ice, physical therapy, orthotics, bracing, oral and topical medications, and surgery which could include surgical reconstruction of the metatarsal adductus, surgical reconstruction of the cavus, and/or repair of the peroneus brevis and or longus.  I discussed that doing the adductus and cavus reconstruction as well as the peroneal repair would be considered a relatively aggressive approach.  However, we also discussed the relatively higher likelihood for recurrence if these other concerns are present and just the peroneal repair is done.  I do note the cavus is relatively mild.  I recommend an MRI scan to evaluate for tear versus tendinopathy of the brevis and or longus.  Options for follow-up were discussed.  The patient verbalized a preference to follow-up face-to-face to discuss the MRI results.  This pleasant gentleman verbalized understanding of and agreement with the plan, and all questions were answered.

## 2024-01-12 NOTE — NURSING NOTE
"Reason For Visit:   Chief Complaint   Patient presents with    Consult     2nd Opinion. Started in right foot, now in left also. Has seen Dr Art at [facility]. Has been getting injections. Last was 7/28/21 - Did not help. [facility] offered surgery. But patient needed to have back surgery suddenly. Last saw [facility] Sept 2023. Swelling is much worse lately.   Larry TKA with Dr Bayron BOSWELL. Has seen Dr Fields. Neck surgeries. Etc.        Ht 1.727 m (5' 8\")   Wt 113.4 kg (250 lb)   BMI 38.01 kg/m           Salome Durand, ATC    "

## 2024-01-12 NOTE — LETTER
1/12/2024         RE: Jigar Brooks  [address] [id number]        Dear Colleague,    Thank you for referring your patient, Jigar Brooks, to the [facility] [place]. Please see a copy of my visit note below.    Orthopaedic Surgery Clinic - New Patient    Chief Complaint:  Right lateral hindfoot pain.    History:  I had the opportunity to meet this very pleasant 64-year-old patient today as new patient to me at confirmatory consultation at the patient's request for evaluation and management of a painful right hindfoot of chronic duration.  The patient reports onset of pain sometime around 2019 with a reported history of golfing around that time, pushing off with the right foot, and feeling a sudden pain and swelling in the right lateral ankle and hindfoot.  He saw Dr. Art at [facility].  The patient more recently saw Mandeep Askew PA-C working with Dr. Art, on 09/21/2023.  He reports he has subsequently had worsening of his symptoms since about last Thanksgiving while bird hunting.  The patient reports having had a series of approximately 5 corticosteroid injections under ultrasound guidance in the right peroneal tendon sheath.  The patient reports that the results after the injections appear to have mostly followed a pattern of an initial period of about a week before any pain relief followed by a temporary period of close to 100% pain relief of the pre-injection symptoms.  He reports multiple episodes since the onset of his symptoms, of right ankle instability which from his verbal and demonstrative description appear to have been eversion rather than inversion.  He denies having tried physical therapy, custom orthotics, or topical medications, but does report having tried NSAIDs and icing.  He reports his pain level is about 3 out of 10 in severity at the time being but it can wax and wane with activity.  He is an active outdoorsman and is an avid [place] [facility] and in [place] well [place]  He states he had discussed the option for right peroneus longus surgery with Dr. Art's office and has elected to not proceed with him at this point in time.  He reports similar symptoms in the left hindfoot as well, though more severe in the right.    PMH:  The patient's past medical history is reviewed with the patient and in the medical record.  He is otherwise generally healthy.  Social history:  He does smoke occasional cigars; he denies tobacco use otherwise.  Allergies:  No known drug allergies.  Medications:  His medication list is reviewed and includes benzonatate and Valtrex (he had a recent episode of shingles).  Past surgical history:  He has had bilateral TKA's with Dr. Verma at [facility], and reports he has also had spine and neck surgery.    Examination:  Recorded Estimated body mass index is 38.01 kg/m  as calculated from the following:    Height as of this encounter: 1.727 m (5' 8\").    Weight as of this encounter: 113.4 kg (250 lb).  Examination today shows the patient to be a pleasant, cooperative, awake, and alert adult sitting upright in a regular chair in no acute distress.  Normal shoewear, removed examination on both feet.  No assistive gait devices are seen.  Respirations are regular and nonlabored on room air.  Skin on the right ankle and foot is intact without visible open wounds, ulcerations, or any skin that appears to be at immediate obvious risk.  No visible ecchymosis or erythema.  3/4 easily palpable right DP and PT pulses.  Light touch sensation is endorsed as intact to light touch in all dermatomes.  5/5 right tib ant, EHL, gastrocsoleus, PTT, and peroneal strength.  No demonstrable subluxation or dislocation of the peroneal tendons.  Resisted eversion strength appears to reproduce right lateral hindfoot pain.  There is tenderness to palpation over the peroneal tendons from just posterior and distal to the distal " fibula along the lateral hindfoot towards both the region around the plantar aspect of the cuboid as well as the fifth metatarsal base, both about equally.  The posterior heel is nontender to palpation.  There is no palpable defect in the Achilles.  Matthews's test shows continuity of the triceps surae mechanism.  The plantar fascia, direct subcalcaneal region, and first metatarsal are all nontender to palpation.  The fifth metatarsal base is tender to palpation; the fifth metatarsal shaft and head are nontender.  Nontender to palpation at the dorsal midfoot including the Lisfranc joint.  Anterior drawer test is 0.  Varus tilt test shows no obvious instability.  No tenderness over the lateral ankle ligaments.  Standing examination suggests a the presence of a mild symmetric bilateral peekaboo heel sign.  There is a slight clinically apparent varus deformity to the bilateral heels which is also symmetric.  Bryan block test does appear to correct this deformity to neutral.  He does appear to have the clinical appearance of a symmetric bilateral foot cavus arch.  Fixed right hallux IP joint plantarflexion contracture.    Imaging:  Independent review of imaging was performed including weightbearing bilateral AP and mortise ankle radiographs and weightbearing bilateral AP, oblique, and lateral foot radiographs dated today, 01/12/2024.  Images were discussed with the patient.  There does appear to be symmetric metatarsus adductus present with approximate Sgarlato angle 21 degrees on the right and 25 degrees on the left.  Despite the clinical appearance of bilateral cavus foot, the longitudinal arch appears to be relatively neutral with an approximate Meary's angle of 0 degrees on the left and 5 degrees apex dorsal on the right and with an approximate calcaneal pitch of 20 degrees bilaterally.  No obvious evidence for tumor, infection, or acute fracture.  Relatively mild insertional Achilles enthesopathy bilaterally,  left worse than right.  Plantar calcaneal enthesopathy bilaterally.  Right claw hallux.  Ankle mortises are congruent bilaterally without evidence for medial clear space widening or syndesmotic widening.  Suggestion of a lateral talar dome lucency in the left ankle.  Os peroneum present bilaterally.    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  This could include tendinosis and/or tearing.  Bilateral metatarsus adductus.  Bilateral cavus foot.    Plan:  The findings, diagnoses, and treatment options, both nonsurgical and surgical, were discussed with the patient in layman's terms.  Full opportunity was given to him to participate in the shared decision-making process.  I discussed ice, physical therapy, orthotics, bracing, oral and topical medications, and surgery which could include surgical reconstruction of the metatarsal adductus, surgical reconstruction of the cavus, and/or repair of the peroneus brevis and or longus.  I discussed that doing the adductus and cavus reconstruction as well as the peroneal repair would be considered a relatively aggressive approach.  However, we also discussed the relatively higher likelihood for recurrence if these other concerns are present and just the peroneal repair is done.  I do note the cavus is relatively mild.  I recommend an MRI scan to evaluate for tear versus tendinopathy of the brevis and or longus.  Options for follow-up were discussed.  The patient verbalized a preference to follow-up face-to-face to discuss the MRI results.  This pleasant gentleman verbalized understanding of and agreement with the plan, and all questions were answered.      Sebastián Tejada MD

## 2024-01-17 ENCOUNTER — HOSPITAL ENCOUNTER (OUTPATIENT)
Dept: MRI IMAGING | Facility: CLINIC | Age: 64
Discharge: HOME | End: 2024-01-17
Attending: ORTHOPAEDIC SURGERY | Admitting: ORTHOPAEDIC SURGERY
Payer: COMMERCIAL

## 2024-01-17 PROCEDURE — 73721 MRI JNT OF LWR EXTRE W/O DYE: CPT | Mod: 26 | Performed by: RADIOLOGY

## 2024-01-17 PROCEDURE — 73721 MRI JNT OF LWR EXTRE W/O DYE: CPT | Mod: RT

## 2024-02-02 ENCOUNTER — OFFICE VISIT (OUTPATIENT)
Dept: ORTHOPEDICS | Facility: CLINIC | Age: 64
End: 2024-02-02
Payer: COMMERCIAL

## 2024-02-02 DIAGNOSIS — S86.311D PERONEAL TENDON TEAR, RIGHT, SUBSEQUENT ENCOUNTER: Primary | ICD-10-CM

## 2024-02-02 PROCEDURE — 99214 OFFICE O/P EST MOD 30 MIN: CPT | Performed by: ORTHOPAEDIC SURGERY

## 2024-02-02 NOTE — NURSING NOTE
Reason For Visit:   Chief Complaint   Patient presents with    RECHECK     Patient returns 3 weeks since last visit and is here to review MRI results of his right ankle from 1/17/24.       There were no vitals taken for this visit.    Pain Assessment  Patient Currently in Pain: Yes  0-10 Pain Scale: 2  Primary Pain Location: Ankle    Konstantin Orona, ATC

## 2024-02-02 NOTE — PROGRESS NOTES
Orthopaedic Surgery Clinic Follow-up Appointment    Chief Complaint:  Right lateral hindfoot pain.  Follow-up MRI scan.    I saw this pleasant 54-year-old gentleman today in follow-up to review his MRI scan results from his right peroneal pain.  The patient reports that currently he is having no pain at all.  He rates it as 2 out of 10 in severity in general but today he denies pain.  He reports he was able to go fly fishing several times within the past few weeks.  He does wear a higher boot which seems to help.  He localizes the pain as located in the lateral right hindfoot, when he does have pain.  He does state he has reviewed the MRI report.    Examination today shows the right ankle to have intact skin.  He is currently nontender to palpation throughout the course of the right peroneus longus and brevis.  He is nontender to palpation over the os peroneum.  There is swelling in that region.  He is nontender to palpation over the posterior tib tendon medially.  5 out of 5 right foot eversion strength.    I did review the recent right ankle MRI scan with the patient and showed him images.  There is a complete rupture of the peroneus longus at the level of the enlarged peroneal tubercle on the calcaneus, with retraction of the tendon ends  by 6.1 cm.  The proximal end appears to lie near the lateral malleolus and the distal end at the cuboid pulley.  Tendinopathy of the peroneus brevis without tear.  There is tendinopathy of the posterior tibial tendon.  No obvious tumor, infection, or acute fracture.    Impression:  64-year-old gentleman with right peroneus longus rupture and underlying cavus foot and metatarsus adductus deformities.    Plan:  Findings and treatment plan were discussed with the patient in layman's terms.  Full opportunity was given to him to participate in the shared decision making process.  I discussed conservative options thoroughly and systematically.  I also discussed the various  surgical options.  Of the surgical options, if he were to have surgery, I would recommend a resection of the right peroneus longus tear and tenodesis of the longus to the intact brevis, with a possible repair depending on operative findings (thought to be unlikely).  The nature of the surgery, the risks, benefits, alternatives, postop plan, and realistic expectations for outcome were all discussed in layman's terms.  I discussed that as he is not having symptoms now, surgery is not absolutely necessary.  I also told him that although I have not performed an allograft for this problem I would ask another surgeon to see if that would be an option.  He verbalized his wish to think about his options as he does have lumbar spine issues that are also concerning him.  Various options for follow-up were offered including MyChart, face-to-face appointment, or a phone call.  I would be very happy to see him back at any point in time to discuss his diagnosis and treatment options again.  All questions were answered.

## 2024-02-02 NOTE — LETTER
2/2/2024         RE: Jigar Brooks  [address] [id number]        Dear Colleague,    Thank you for referring your patient, Jigar Brooks, to the [facility] [place]. Please see a copy of my visit note below.    Orthopaedic Surgery Clinic Follow-up Appointment    Chief Complaint:  Right lateral hindfoot pain.  Follow-up MRI scan.    I saw this pleasant 54-year-old gentleman today in follow-up to review his MRI scan results from his right peroneal pain.  The patient reports that currently he is having no pain at all.  He rates it as 2 out of 10 in severity in general but today he denies pain.  He reports he was able to go fly fishing several times within the past few weeks.  He does wear a higher boot which seems to help.  He localizes the pain as located in the lateral right hindfoot, when he does have pain.  He does state he has reviewed the MRI report.    Examination today shows the right ankle to have intact skin.  He is currently nontender to palpation throughout the course of the right peroneus longus and brevis.  He is nontender to palpation over the os peroneum.  There is swelling in that region.  He is nontender to palpation over the posterior tib tendon medially.  5 out of 5 right foot eversion strength.    I did review the recent right ankle MRI scan with the patient and showed him images.  There is a complete rupture of the peroneus longus at the level of the enlarged peroneal tubercle on the calcaneus, with retraction of the tendon ends  by 6.1 cm.  The proximal end appears to lie near the lateral malleolus and the distal end at the cuboid pulley.  Tendinopathy of the peroneus brevis without tear.  There is tendinopathy of the posterior tibial tendon.  No obvious tumor, infection, or acute fracture.    Impression:  64-year-old gentleman with right peroneus longus rupture and underlying cavus foot and metatarsus adductus deformities.    Plan:   Findings and treatment plan were discussed with the patient in layman's terms.  Full opportunity was given to him to participate in the shared decision making process.  I discussed conservative options thoroughly and systematically.  I also discussed the various surgical options.  Of the surgical options, if he were to have surgery, I would recommend a resection of the right peroneus longus tear and tenodesis of the longus to the intact brevis, with a possible repair depending on operative findings (thought to be unlikely).  The nature of the surgery, the risks, benefits, alternatives, postop plan, and realistic expectations for outcome were all discussed in layman's terms.  I discussed that as he is not having symptoms now, surgery is not absolutely necessary.  I also told him that although I have not performed an allograft for this problem I would ask another surgeon to see if that would be an option.  He verbalized his wish to think about his options as he does have lumbar spine issues that are also concerning him.  Various options for follow-up were offered including MyChart, face-to-face appointment, or a phone call.  I would be very happy to see him back at any point in time to discuss his diagnosis and treatment options again.  All questions were answered.      Sincerely,        Sebastián Tejada MD

## 2024-02-03 PROBLEM — S86.311D PERONEAL TENDON TEAR, RIGHT, SUBSEQUENT ENCOUNTER: Status: ACTIVE | Noted: 2024-02-03

## 2024-02-08 ENCOUNTER — DOCUMENTATION ONLY (OUTPATIENT)
Dept: OTHER | Facility: CLINIC | Age: 64
End: 2024-02-08
Payer: COMMERCIAL

## 2024-02-08 NOTE — PROGRESS NOTES
I reviewed the case with Dr. Art as discussed with the patient at his recent clinic appointment. Consensus on appropriate surgical treatment being a tenodesis of the right peroneus longus to the brevis for an irreparable tear, rather than an allograft. The possibility of an FHL tendon transfer can also be considered. I relayed these findings to the patient by phone today. I discussed that surgery could depend on his symptoms, and can be deferred if he is not having significant pain, but can proceed at any time if he is having significant pain. I discussed that surgery could consist of a repair of the peroneus longus if reparable, and resection of the torn longus and tenodesis of the tendon ends to the peroneus brevis if not reparable. I discussed that the brevis does have tendinitis but no visible tear. I discussed that it is possible that a small tear might not be seen on the MRI scan but might be found at surgery, in which case it would likely be reparable and the tenodesis could still occur. I discussed the very small chance that the brevis looked intact on the MRI and be irreparable at surgery which could prompt the need for an FHL transfer. I discussed what the consequences of losing normal FHL function included. Jigar also expressed a wish that if/when surgery did occur in the future, to also have the os peroneum resected. 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 He noted he is having spine and knee issues at this time and is having this worked up first. I encouraged him to reach out to our team at any time in the future should he wish to be seen again to discuss options for his right foot, have any questions, or to move forward with surgery. All questions were answered. This very pleasant gentleman  verbalized understanding of our discussion and appreciation of the call.

## 2024-03-17 ENCOUNTER — HEALTH MAINTENANCE LETTER (OUTPATIENT)
Age: 64
End: 2024-03-17

## 2024-08-18 NOTE — TELEPHONE ENCOUNTER
This will not affect his knee replacement.     Calamine lotion probably won't help much, but he can apply if he wants to try.     Anupama Ernandez PA-C     present

## 2025-01-14 ENCOUNTER — TELEPHONE (OUTPATIENT)
Dept: ORTHOPEDICS | Facility: CLINIC | Age: 65
End: 2025-01-14
Payer: COMMERCIAL

## 2025-01-14 NOTE — TELEPHONE ENCOUNTER
Patient was called back regarding an appointment being needed for ankle. He requested to see Dr. Jimenez who is at [facility]. I provided contact number for patient to call and schedule. Patient thanked me for relaying that.     Markus Tolentino CMA

## 2025-01-14 NOTE — TELEPHONE ENCOUNTER
Pt is requesting to be seen by Dr Fields for a Ruptured perineal tendon longus right ankle, no past surgery has a recent MRI and x-ray, past patient of Dr. Tejada. No referral. Please review and call patient to schedule if appropriate.     Could we send this information to you in [facility] or would you prefer to receive a phone call?:   Patient would prefer a phone call   Okay to leave a detailed message?: Yes at Cell number on file:    Telephone Information:   Mobile [phone]

## 2025-03-22 ENCOUNTER — HEALTH MAINTENANCE LETTER (OUTPATIENT)
Age: 65
End: 2025-03-22